# Patient Record
Sex: FEMALE | Race: BLACK OR AFRICAN AMERICAN | Employment: UNEMPLOYED | URBAN - METROPOLITAN AREA
[De-identification: names, ages, dates, MRNs, and addresses within clinical notes are randomized per-mention and may not be internally consistent; named-entity substitution may affect disease eponyms.]

---
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
## 2017-01-03 NOTE — PATIENT INSTRUCTIONS
Refill policies:    • Allow 2 business days for refills; controlled substances may take longer.   • Contact your pharmacy at least 5 days prior to running out of medication and have them send an electronic request or submit request through the “request re your physician has recommended that you have a procedure or additional testing performed. [facility]) will contact your insurance carrier to obtain pre-certification or prior authorization.     Unfortunately, [facility] has seen an increas registration in the [employer].     Medication:   Number of days you need to be off for the following medications:  • Aggrenox 10 days   • Agrylin (Anagrelide) 10 days   • Enbrel (Etanercept) 24 hours   • Fragmin (Dalteparin) 24 hours   • Pletal Austyn Rivero your blood sugar. Contact your primary care physician if your blood sugar rises as you may require some medication adjustment.   It is normal to have increased pain at injection site for up to 3-5 days after procedure, you can use heat for the first 24 bianca

## 2017-01-11 NOTE — TELEPHONE ENCOUNTER
Need additional insurance information for prior authorization request.    Spoke to patient, provided insurance contact phone #[phone]. Advised patient will contact her with any further issues, no further needs at this time.

## 2017-01-11 NOTE — TELEPHONE ENCOUNTER
Spoke to Sue at [address] requests ICD codes and facility tax ID, both provided, Sue did not take procedure codes. Sue states precertification will be required, provided precertification phone #[phone].  Time on call:2

## 2017-01-12 NOTE — TELEPHONE ENCOUNTER
Trevor Truong from [facility] contacted office to complete prior authorization request. Provided patient information, physician and facility information.  Requesting 2 dates of service for Thoracic Facet Injection ([id number]) and 2 dates of servic

## 2017-01-16 NOTE — TELEPHONE ENCOUNTER
Spoke with pt and informed her that any insurance questions needs to be answered by her insurance. Pt requesting Shyla's 1/3/2017 documentation be changed. I informed pt that she would need a follow up visit to discuss documentation. Call ended.

## 2017-01-16 NOTE — TELEPHONE ENCOUNTER
Spoke to [address] [place] [address] at [facility], all procedures have been denied, request does not meet medical necessity criteria. No option for Peer-Peer available. Jessie De Anda states an appeal can be made by sending [facility] and supporting documents to fax # [phone].

## 2017-01-16 NOTE — TELEPHONE ENCOUNTER
Returning call. Informed patient that all injections have been cancelled due to procedures being denied. Patient understood.

## 2017-01-16 NOTE — TELEPHONE ENCOUNTER
Harold Mcardle from [employer] contacted office, indicated she has spoken to patient regarding denial. Patient has requested office submit appeal for cervical injections.      Harold Mcardle states for appeal to be re-viewable, physicians notes need to be addended to Maki Domi

## 2017-01-16 NOTE — TELEPHONE ENCOUNTER
Spoke with kraig pt. Informed her of message received by insurance company, pt did not want to talk to RN. Pt transferred to prior authorization dept.

## 2017-01-16 NOTE — TELEPHONE ENCOUNTER
Patient called wanted to know if we were told my the insurance company what the medical criteria, I explained we ere not told. She said she will wait for [facility] from [address] to call her back.

## 2017-01-17 NOTE — TELEPHONE ENCOUNTER
Paul Mccoy from [employer] contacted office, states appeal request was received, states \"letter looks very good, looks like all required information was included\", advised she sent it to medical director review, requested a pain specialist review case.  Ke

## 2017-01-17 NOTE — TELEPHONE ENCOUNTER
Letter of Medical Necessity drafted, submitted with clinical notes, imaging reports and PT notes to [address] [facility] under ref #[id number], fax confirmation received, appeal request pending.

## 2017-01-17 NOTE — TELEPHONE ENCOUNTER
Spoke to patient,who was advised by Jordan Morgan at [employer] to contact office to provide following information:    Patient has had trial of NSAIDs including:  Ibuprofen OTC  Meloxicam 7.5mg and 15mg  Diazepam  Tramadol    Patient begin Physical Therapy thr

## 2017-01-20 NOTE — TELEPHONE ENCOUNTER
1. I was able to take care of Atenolol and Hydrochlorothiazide prescriptions. 2. The Tramadol and Diazepam can't be refilled until the full month expires from prescription dated 1/13/17. Alvin Rothman.  Britt Drummond MD  Diplomate, [address] [place] [address] Board of Internal Medicine

## 2017-01-23 NOTE — TELEPHONE ENCOUNTER
Spoke with pt informed her that we are still waiting for prior authorization. No further questions at this time.

## 2017-01-23 NOTE — TELEPHONE ENCOUNTER
Spoke with patient and informed patient of message below. Pt verbalized understanding. No further questions at this time.            [address]  PRE-PROCEDURE INSTRUCTIONS WITH IV SEDATION    Appointment Date:   2/14/17     &     2/21/17 ? Plavix (Clopidogrel)  ? Epidural ____ - 10 days  ? Others 7 days   NSAIDs: 24 hours    ?  Ibuprofen (Motrin, Advil, Vicoprofen), Naproxen (Naprosyn, Aleve), Piroxcam (Feldene), Meloxicam (Mobic), Oxaprozin (Daypro), Diclofenac (Voltaren),  Indomethacin (I Am I a candidate for radiofrequency ablation? Radiofrequency ablation is most commonly offered to patients with neck or back pain from facet joint problems like arthritis or injury.  For these patients radiofrequency ablation is used to interrupt nerves th The skin on the back or neck is cleaned with antiseptic solution and then the procedure is carried out. The skin is numbed with a local anesthetic. Then X-ray or fluoroscopy is used to guide placement of the introducer needles.  Since nerves cannot actually No. This procedure is done under local anesthesia. Most of the patients also receive intravenous sedation, which makes the procedure easier to tolerate.  It is necessary for you to be awake enough to communicate easily with the physician during the procedur It is sometimes difficult to predict if the radiofrequency ablation will indeed help you or not.  Generally speaking, the patients who have responded well to trial blocks will have better results than those who responded less well from diagnostic or trial i

## 2017-01-23 NOTE — TELEPHONE ENCOUNTER
Lakesha Wasserman from [employer] contacted office, states appeal request has been completed. Lakesha Wasserman states that all request for treatment of thoracic spine have been denied.  Request for RFA of bilateral Cervical Medial Branches have been approved, authorization #[id number]

## 2017-02-09 NOTE — TELEPHONE ENCOUNTER
Spoke to patient, states her insurance terminated on 1/23/17 and patient did not qualify for COBRA coverage. Patient states to cancel 2/14 and 2/21 procedures as she currently is uninsured.  Patient thanked office for hard work and help in getting injection

## 2017-02-09 NOTE — TELEPHONE ENCOUNTER
From: Dennis Clubs  To: Benigno Turk MD  Sent: 2/8/2017 5:21 PM CST  Subject: Visit Donel Candy Avilez,    I have recently lost my insurance coverage so I am unable to keep my appt with you, Feb 10th., Dr. Ramila Durbin or Dr. Caryl Velásquez, the

## 2017-04-06 NOTE — PATIENT INSTRUCTIONS
Refill policies:    • Allow 2 business days for refills; controlled substances may take longer.   • Contact your pharmacy at least 5 days prior to running out of medication and have them send an electronic request or submit request through the “request re insurance carrier to obtain pre-certification or prior authorization. Unfortunately, [facility] has seen an increase in denial of payment even though the procedure/test has been pre-certified.   You are strongly encouraged to contact your insurance carrier to v on the day of procedure. If you require a refill of medications, please contact the office 48 hours prior to your procedure.       Medication:   Number of days you need to be off for the following medications:  • Aggrenox 10 days   • Agrylin (Anagrelide) 10 frequency of your glucose monitoring after the procedure as this may cause a temporary increase in your blood sugar. Contact your primary care physician if your blood sugar rises as you may require some medication adjustment.   It is normal to have increas anesthetic before inserting the injection needle. Once numbed, placing the injection needle often feels like more of a strong pressure and pinching, often not as sharp pain. Will I be \"put out\" for the facet joint injection?   No, this procedure is done symptoms in 1 to 2 weeks a third injection maybe recommended. Can I have more than three facet joint injections? Most patients do not receive more than three injections in a six-month period.  This is because the effect of the medication injected frequen arthritis or injury. For these patients radiofrequency ablation is used to interrupt nerves that go directly to the individual facet joints. You must have responded well to diagnostic or trial injections to be a candidate for radiofrequency ablation.     Wh the procedure that you can report what you are feeling. The tissues surrounding the special electrically active needle tip are then heated when electric current is passed through it. This effectively numbs or stuns the nerves semi-permanently.  Once done, t last?  If successful, the effects of the radiofrequency ablation can last from 3-24 months, with a typical range of 6-9 months. How many radiofrequency ablations do I need to have?   If the first procedure does not completely relieve your symptoms, you ma

## 2017-04-06 NOTE — PROGRESS NOTES
Name: Reginald Membreno   : 2/10/1970   DOS: 2017     Pain Clinic Follow Up Visit:   Patient presents with:   Follow - Up: low back pain, neck pain      Reginald Membreno is a 52year old female who presents for recheck of chronic neck, thora TABLET BY MOUTH EVERY MORNING Disp: 90 tablet Rfl: 1   atenolol 25 MG Oral Tab Take 1 tablet (25 mg total) by mouth daily.  Disp: 90 tablet Rfl: 1   hydrocortisone 2.5 % External Cream  Disp:  Rfl:    AmLODIPine Besylate 5 MG Oral Tab Take 1 tablet (5 mg to Patient come back for bilateral subdeltoid bursa injection, right than left lumbar facet joint injections, upper right and left thoracic facet joint injection followed by radiofrequency ablation of the right than left cervical medial branch all with sedati

## 2017-04-10 PROBLEM — M51.36 BULGING LUMBAR DISC: Status: ACTIVE | Noted: 2017-04-10

## 2017-04-10 PROBLEM — M47.812 CERVICAL SPINE ARTHRITIS: Status: ACTIVE | Noted: 2017-04-10

## 2017-04-10 PROBLEM — M47.816 ARTHRITIS, LUMBAR SPINE: Status: ACTIVE | Noted: 2017-04-10

## 2017-04-10 PROBLEM — M51.369 BULGING LUMBAR DISC: Status: ACTIVE | Noted: 2017-04-10

## 2017-04-10 PROBLEM — M47.814 THORACIC ARTHRITIS: Status: ACTIVE | Noted: 2017-04-10

## 2017-04-10 PROBLEM — M54.16 CHRONIC LUMBAR RADICULOPATHY: Status: ACTIVE | Noted: 2017-04-10

## 2017-04-10 NOTE — TELEPHONE ENCOUNTER
Spoke to patient, advised of message below. Patient will call insurance to confirm they will only cover one procedure. Patient states if that is the case, she prefers shoulders be completed.  Advised patient will wait to cancel additional procedures until s

## 2017-04-10 NOTE — TELEPHONE ENCOUNTER
Spoke to Cheyenne at [employer], she stated for outpatient surgery no Leonela Vasquez is needed, she did state that her insurance only allows one procedure per her plan year and will only pay $250 and the remainder of the procedures the pt would be responsible.  Call ref#[id number]

## 2017-04-10 NOTE — PROGRESS NOTES
Patient presents with: Other: f/u HTN and Raynaud's - 4-month follow up  Other: chronic neck and back pain syndrome      HPI: The patient presents today for eval of multiple concerns:    1. HTN - Stable on prescription medication.   She is compliant w/ Rxs daily. Disp: 90 tablet Rfl: 1   FLUTICASONE PROPIONATE 50 MCG/ACT Nasal Suspension USE 2 SPRAYS IN EACH NOSTRIL DAILY NEEDED FOR RHINITIS. Disp: 16 g Rfl: 3   Cholecalciferol (VITAMIN D) 2000 UNITS Oral Cap Take by mouth.  Disp:  Rfl:    Fexofenadine HCl (A q AM to see if this'll help.   3. Chronic neck and back pain 2/2 of spinal arthritis (cervical, thoracic, and lumbar) along w/ lumbar disc bulging and chronic lumbar radiculopathy - She's been working w/ Dr. Manny Echeverria team from interventional pain medicine,

## 2017-04-11 NOTE — TELEPHONE ENCOUNTER
Patient returned call, confirmed current insurance will only cover 1 procedure per year, patient requesting to have \"low back procedures\" as 1 procedure, move shoulders after.  Patient requests that additional injections are not cancelled, states she is l

## 2017-04-19 NOTE — TELEPHONE ENCOUNTER
Patient added to waitlist for a sooner appointment. Patient requesting an estimate for injection, provided patient with number for [facility]. Patient verbalized understanding, no further needs at this time.

## 2017-05-01 NOTE — TELEPHONE ENCOUNTER
Patient returned call, agreeable to moving appointments sooner. Advised patient will move all procedures up by one week. Patient verbalized understanding, no further needs at this time.

## 2017-05-01 NOTE — TELEPHONE ENCOUNTER
[address] [place] [address]  PRE-PROCEDURE INSTRUCTIONS WITH IV SEDATION    Appointment Date: 5/2/17, 5/23/17, 5/30/17, 6/6/17, 6/13/17, 6/20/17 and 6/27/17   Arrival Time: 1:00pm, 12:00pm, 12:00pm, 12:00pm, 12:00pm, 12:00pm and 12:00pm  Procedure Time: 2: ? Xarelto (Rivaroxaban) 3 days  ? Lovenox (Enoxaparin) 24 hours  ? Aspirin  ? 81mg 24 hours  ? Greater than 81 mg (325mg) 7 days  ? Coumadin Procedure may be cancelled if INR is elevated. ? Epidural ____ - 7 days  ? Others 5 days  ?  Excedrin (with aspiri

## 2017-05-02 NOTE — TELEPHONE ENCOUNTER
After consulting with patient in the preop area today and also with Dr. Myriam Maravilla, we recommend patient to be scheduled for the following injections.  Please use the time slot set aside on 5/9 for the first one and then the other injections per patient availabil

## 2017-05-02 NOTE — H&P
History & Physical Examination    Patient Name: Vivienne Barros  MRN: [id number]  CSN: [id number]  YOB: 1970    Pre-Operative Diagnosis:  Subdeltoid bursitis, unspecified laterality [M75.50]    Present Illness: A 52year old female with Degenerative joint disease C5-C6   • Multinodular thyroid 9/16/2016         Past Surgical History    TUBAL LIGATION  2002    HYSTERECTOMY  9/11/2012    Comment partial, still has cervix and ovaries    OTHER SURGICAL HISTORY  2009    Comment colposcopy the History and Physical done within the last 30 days. Any changes noted above.     JAMAICA Davis

## 2017-05-02 NOTE — OR NURSING
[address], [facility] updated pt took her Meloxicam last evening at 1700. Per Dr Myriam Maravilla ok to continue with the procedure.

## 2017-05-03 NOTE — TELEPHONE ENCOUNTER
Spoke to patient, patient states office let her down as she thought she should have SI injection prior to shoulder. Patient completed Bursa injection of 5/2. Patient requesting to be on the schedule on 5/9 for Bilateral SI.  Advised patient will place her o

## 2017-05-03 NOTE — OPERATIVE REPORT
[facility]  Operative Report  2017     Ana Song Patient Status:  Hospital Outpatient Surgery    2/10/1970 MRN [id number]   Location [facility] Attending No att. providers found   [facility] Day # 0 PCP each side. The patient tolerated the procedure very well. The patient has complete understanding of the risks and benefits of the procedure. Complications: None. Follow up: The patient will be followed in the pain clinic as needed basis.       Wendy Kennedy

## 2017-05-08 NOTE — TELEPHONE ENCOUNTER
Spoke to patient, states she does not want to proceed with injections as scheduled as she cannot afford them. Patient states she will be getting new insurance as of 6/1, would like to reschedule injections after 6/1.  Procedures rescheduled, patient advised ? If you have an implanted Spinal Cord or Peripheral Nerve Stimulator: Please remember to turn device off for procedure      Medication:   Number of days you need to be off for the following medications:  ? Aggrenox 10 days   ?  Agrylin (Anagrelide) 10 days If you are a diabetic, please increase the frequency of your glucose monitoring after the procedure as this may cause a temporary increase in your blood sugar.   Contact your primary care physician if your blood sugar rises as you may require some medicatio

## 2017-06-14 NOTE — TELEPHONE ENCOUNTER
Patient returned call, states she has not had a change of insurance. Patient states that she spoke with her insurance and was told that she is eligible for \"re-pricing\".  Patient states she called central scheduling and they could not give her an exact co

## 2017-06-14 NOTE — TELEPHONE ENCOUNTER
Spoke to patient, confirmed she is cancelling all future procedures. Patient states she will be getting new insurance at open enrollment time, wants to know how far in advance she can call to schedule procedures.  Advised patient to call office as soon as s

## 2017-06-14 NOTE — TELEPHONE ENCOUNTER
From: Chapito Thomas  To: Cristofer Major MD  Sent: 6/14/2017 1:06 PM CDT  Subject: Medication Renewal Request    Original authorizing provider: MD Chapito Leahy would like a refill of the following medications:  TRAMADOL HCL 50

## 2017-06-15 NOTE — PROGRESS NOTES
Donis Tyler is a 52year old female. HPI:   Patient presents with: Follow - Up  Lab Results  Patient presents to discuss several issues. She has had unexplained weight loss, going on for past 6-7 months.   Has lost almost 30 pounds since Oct 10 MG Oral Tab, Take 1 tablet (10 mg total) by mouth daily. , Disp: 90 tablet, Rfl: 1  •  hydrocortisone 2.5 % External Cream, , Disp: , Rfl:   •  FLUTICASONE PROPIONATE 50 MCG/ACT Nasal Suspension, USE 2 SPRAYS IN EACH NOSTRIL DAILY NEEDED FOR RHINITIS., D auscultation bilaterally, no wheezing/rubs  CARDIO: RRR without murmurs. No clubbing, cyanosis or edema.   GI: soft non tender nondistended no hepatosplenomegaly, bowel sounds throughout  NEURO: CN II-XII intact  MS: Full ROM, no joint pain  PSYCH: pleasan

## 2017-06-15 NOTE — PATIENT INSTRUCTIONS
- check with insurance about coverage for x-ray and blood work  - check with insurance about in-network endocrinologists  - Depending on results, we may consider starting an anti-depressant medication. It was a pleasure seeing you in the clinic today.

## 2017-07-10 PROBLEM — J30.9 CHRONIC ALLERGIC RHINITIS: Status: ACTIVE | Noted: 2017-07-10

## 2017-07-10 NOTE — PROGRESS NOTES
Patient presents with: Follow - Up: Blood pressure; Raynaud's; Chronic allergies      HPI: The pt presents today for f/u chronic medical conditions, specifically HTN, Raynaud's and Allergic rhinitis.   Doing well on prescription medication regarding the HT TAKE 1 TABLET (25 MG TOTAL) BY MOUTH DAILY. , Disp: 90 tablet, Rfl: 1  •  Meloxicam 5 MG Oral Cap, Take by mouth., Disp: , Rfl:   •  hydrocortisone 2.5 % External Cream, , Disp: , Rfl:   •  FLUTICASONE PROPIONATE 50 MCG/ACT Nasal Suspension, USE 2 SPRAYS IN ask questions, which were then answered to the best of my ability. Treasure Taylor. Radha Goetz MD  Diplomate, American Board of Internal Medicine  [facility]  [address]  [facility] [facility],[address]Th Floor, [address], [facility], [address] [place] [address]  T: [id number]; F: [phone]     Meds &

## 2017-08-07 NOTE — TELEPHONE ENCOUNTER
From: Dangelo Castellon  To: Patricio Patel MD  Sent: 8/6/2017 10:14 PM CDT  Subject: Test Results Question    Helmargo Mccoy. I trust you had a bing weekend. Now that aside. ..     I have several test results that have come in and therefore I am lost.

## 2017-08-14 NOTE — TELEPHONE ENCOUNTER
From: Terra Castellon  Sent: 8/11/2017 1:05 PM CDT  Subject: Medication Renewal Request    Terra Castellon would like a refill of the following medications:  HYDROCHLOROTHIAZIDE 25 MG Oral Tab Beckie Muñoz MD]  AmLODIPine Besylate 10 MG Oral T

## 2017-08-17 NOTE — TELEPHONE ENCOUNTER
Pt called regarding anemia. Pt has appointment with hematologist in 2 weeks but inquiring if she should be on iron supplement until then? Please advise.

## 2017-08-17 NOTE — TELEPHONE ENCOUNTER
The answer would be no. The iron tests that were done did not indicate iron-deficiency. Darion Leija. Remberto Heller MD  Diplomate, American Board of Internal Medicine  [facility]  [address]  [facility],[address]Th Floor, [address], [facility], [address] [place] [address]  T: [phone]; F: 63

## 2017-08-25 NOTE — TELEPHONE ENCOUNTER
Pt called to request a 4 day supply for all her medication as pt is currently on vacation in [place]. Pt stated forgot all medications. Please refill if appropriate for 4 days. Pharmacy in the system already.  [facility]- [facility]

## 2017-09-06 NOTE — PROGRESS NOTES
Patient presents with:  Lab Results: 8-1-17 - Anemia      HPI: The pt presents today essentially for reassessment of anemia via labs. In particular, she has TONY. She will be seeing both Hematology and Gastroenterology soon once her schedule allows.   I ga Externally Cream, Apply  topically as needed. Daily, Disp: , Rfl:   •  selenium sulfide (SELSUN) 2.5 % Apply Externally Lotion, Apply  topically daily as needed (tinea versicolor). , Disp: , Rfl:     Smoking status: Never Smoker TABLET BY MOUTH EVERY 12 HOURS AS NEEDED FOR PAIN      diazepam 5 MG Oral Tab 60 tablet 0      Sig: TAKE 1 TABLET BY MOUTH EVERY 12 HOURS AS NEEDED FOR ANXIETY           Imaging & Consults:  ENT - INTERNAL

## 2017-09-08 NOTE — TELEPHONE ENCOUNTER
Patient called requesting to speak with the nurse regarding her prescription for MELOXICAM 7.5 MG Oral Tab  She is wondering why her prescription is written for \"once a day\" when she has previously been taking 7.5 mg \"twice a day\"

## 2017-09-09 NOTE — TELEPHONE ENCOUNTER
OK to take BID dosing. Does she desire a new script reflecting this change? Ollie Hill. Angel Griffin MD  Diplomate, American Board of Internal Medicine  [facility]  [address] [facility],[address], [address], [facility], [address] [place] [address]  T: [phone]; F: [address]

## 2017-09-11 NOTE — TELEPHONE ENCOUNTER
Done.    Flora Oneal MD  Diplomate, American Board of Internal Medicine  [facility]  [address] [facility],[address], [address], [address]  T: [phone]; F: [address]

## 2017-09-26 NOTE — TELEPHONE ENCOUNTER
Pt called to request a call back form Dr Betzy Cotton. Pt sated \"private patient-Dr conversation\" needed.  Please call [phone]

## 2017-09-28 NOTE — TELEPHONE ENCOUNTER
I called the patient. She has been going thru mood disorder and being under [place] lot of psychological stress. \"  No SI or HI. Not functional well at work or at home. I've recommended Dr. Gerson Quach. Contact info given. Darion Leija.  Remberto Heller MD  Diplomate,

## 2017-10-05 NOTE — TELEPHONE ENCOUNTER
Pt reviewed her lab results via [address] Vitamin B12 and Vit D low normal range and folic acid low. Should she be taking supplements?

## 2017-10-05 NOTE — TELEPHONE ENCOUNTER
Patient is questioning lab results from 's nicholas message - has questions please callback to discuss

## 2017-10-07 NOTE — TELEPHONE ENCOUNTER
A robust once-daily women's multivitamin as well as the consumption of fruits/veggies/whole grains/protein should be sufficient. Ely Priest. Med Castro MD  Diplomate, American Board of Internal Medicine  [facility]  [address], [address], [place]

## 2017-10-12 RX ORDER — DIAZEPAM 5 MG/1
5 TABLET ORAL EVERY 12 HOURS PRN
Qty: 60 TABLET | Refills: 0 | Status: SHIPPED | OUTPATIENT
Start: 2017-10-12 | End: 2017-11-07

## 2017-10-13 NOTE — TELEPHONE ENCOUNTER
MRI shows a stable lesion. Lesion has not grown since 2009. Last office visit was in 11/2016. Should repeat imaging again in 2 years and then follow-up in office to review. Pt should call for order. Thanks.

## 2017-10-17 PROBLEM — D64.9 NORMOCYTIC ANEMIA: Status: ACTIVE | Noted: 2017-10-17

## 2017-10-17 NOTE — PROGRESS NOTES
Hematology Initial Consultation Note    Patient Name: Segundo Castellon  Medical Record Number: [id number]    YOB: 1970   Date of Consultation: 10/17/2017   PCP: Dr. Clemmie Runner  Other providers:  Dr. Wendel Alpers    Reason for Consultation: Surgical History:  9/11/2012: HYSTERECTOMY      Comment: partial, still has cervix and ovaries  2009: OTHER SURGICAL HISTORY      Comment: colposcopy  2015: OTHER SURGICAL HISTORY      Comment: abdominoplasty  2/23/2016: OTHER SURGICAL HISTORY Right      C Used    Alcohol use Yes  0.0 oz/week     Comment: Socially    Drug use: No    Sexual activity: Not on file     Other Topics Concern    Caffeine Concern Yes    Comment: 3-4 or more cups of tea daily    Exercise No    Comment: PT only     Social History Narr 09/05/2017   RDW 15.4 09/05/2017   .0 09/05/2017   .0 08/01/2017   .0 08/25/2016       Lab Results  Component Value Date   GLU 67 (L) 08/01/2017   BUN 21 (H) 08/01/2017   CREATSERUM 1.16 (H) 08/01/2017   CREATSERUM 1.02 08/25/2016   CR Pathology:  I personally reviewed her peripheral blood smear from today which showed some hypersegmented neutrophils, hypochromatic RBCs, otherwise was unremarkable. Impression & Plan:     *Anemia- +fatigue  -CBC with smear review as above.    -ch

## 2017-10-17 NOTE — PROGRESS NOTES
Patient here for initial MD visit. Referred by Dr. Sarah Pool. Was diagnosed with Raynaud's recently. That is when they did the lab work up and found the abnormal labs. C/O increased fatigue.   Also states she has a sleep apnea issue that she needs to [employer]

## 2017-10-17 NOTE — PATIENT INSTRUCTIONS
For Dr. Yvette Alaniz nurse line, call [phone] with any questions or concerns,  Monday through Friday 8:00 to 4:30. After hours or weekends for urgent needs: [phone].   Central [phone]  Medical Records:   [phone]  [facility] Cent

## 2017-10-23 NOTE — TELEPHONE ENCOUNTER
I called pt and discussed labs results. Essentially still has mild anemia, without known cause. Will have her return in 1 month for repeat CBC w/ diff, if hgb not improving, or more abnormalities present, will perform a bmbx at that time.  I told her to anisha

## 2017-11-03 PROBLEM — Z91.09 POLLEN ALLERGIES: Chronic | Status: ACTIVE | Noted: 2017-11-03

## 2017-11-07 NOTE — PROGRESS NOTES
Patient presents with: Other: HTN and Raynaud's f/u      HPI: The pt presents today for eval of 2 issues:    1. HTN - BP's have been low. She is on 3 meds and wants to know if she can stop any of them.   2. Raynaud's disease w/o gangrene - Continues w/ Am Disp: , Rfl:   •  diazepam 5 MG Oral Tab, Take 1 tablet (5 mg total) by mouth every 12 (twelve) hours as needed for Anxiety. , Disp: 60 tablet, Rfl: 0  •  Meloxicam 7.5 MG Oral Tab, Take 1 tablet (7.5 mg total) by mouth 2 (two) times daily. , Disp: 60 tablet Internal Medicine  [facility]  [address] [facility], [address], [facility], [address]  T: [address] [facility]; F: [phone]     Meds & Refills for this Visit:  No prescriptions requested or ordered in this encounter    Imaging & Consults:  None

## 2017-11-07 NOTE — TELEPHONE ENCOUNTER
From: Bart Castellon  Sent: 11/7/2017 4:21 PM CST  Subject: Medication Renewal Request    Bart Duckworth.  Ravinder would like a refill of the following medications:     diazepam 5 MG Oral Tab Debra Elliott MD]     TRAMADOL HCL 50 MG Oral Tab [Sukhdeep Diaz

## 2017-11-08 NOTE — TELEPHONE ENCOUNTER
From: Dangelo Castellon  To: Patricio Patel MD  Sent: 11/7/2017 6:20 PM CST  Subject: Prescription Question    [facility] my physical [facility] for my Diazepam and my Tramadol.  I requested, through your nurse today for an

## 2017-11-09 NOTE — TELEPHONE ENCOUNTER
Dr Carla Torres please reprint Diazepam and Tramadol.   Rx to be faxed to [address] [place] [address] on [facility] store # [phone] and will need to be cancelled on [place] store # [phone]

## 2017-12-01 NOTE — TELEPHONE ENCOUNTER
Requesting   LOV:11/7/17  RTC: PRN  Last Relevant Labs: 8/1/17  Filled:   atenolol 25 MG Oral Tab 4 tablet 0 8/25/2017 8/29/2017    HTN - BP is on the lower end. OK in my opinion to D/C both the Atenolol and the Hydrochlorothiazide.   Continue Amlodipine a

## 2017-12-07 RX ORDER — POTASSIUM CHLORIDE 20 MEQ/1
TABLET, EXTENDED RELEASE ORAL
Qty: 180 TABLET | Refills: 0 | Status: SHIPPED | OUTPATIENT
Start: 2017-12-07 | End: 2018-03-24

## 2017-12-07 NOTE — TELEPHONE ENCOUNTER
Medication(s) to Refill:   Pending Prescriptions Disp Refills    POTASSIUM CHLORIDE ER 20 MEQ Oral Tab CR [Pharmacy Med Name: Potassium Chloride Malika ER Oral Tablet Extended Release 20 MEQ] 90 tablet 0     Sig: TAKE ONE TABLET BY MOUTH TWICE DAILY            Last Time Medication was Filled:  10/26/17, 90 tablets    Last Office Visit with PCP: Visit date not found    When Patient was Due Back to the Office:  (from when PCP last addressed medication)  [] 1 month,  [] 3 months,  [] 6 months,  [] 12 months,  [] Other      Future Appointments  Date Time Provider Kellen Antoine   1/4/2018 1:00 PM Ritchie Ligas, DO [address] [place] [address] SUB GI   1/4/2018 1:15 PM Ritchie Ligas, [address] GI   1/5/2018 1:30 PM MASTER [facility] [place]        [] Prescription needs to be printed at site and faxed to pharmacy  []Controlled Substance, prescription needs to be printed at the site, site to notify patient when ready  Unable to Refill per Protocol:   [] Office visit due (90 day supply prescription extension has already been granted)   [] Blood Pressure out of range   [] Labs Overdue  []  Medication has never been prescribed by [facility] provider   [x] Other     Last Blood Pressures:  BP Readings from Last 2 Encounters:  11/07/17 : 100/60  11/03/17 : 104/74      Recent Labs:

## 2018-01-17 NOTE — PROGRESS NOTES
Tanvi Wolf is a 52year old female who presents for a complete physical exam.   HPI:   Pt presents for annual wellness screening AND discussion of multiple other medical issues. She is aware that she may be billed separately for this.     HTN - History:   Diagnosis Date   • Abdominal distention 2014    Frequently   • Abdominal pain Intermitted   • Anemia    • Anxiety    • Back pain    • Back problem     Degenerative joint disease C5-C6   • Bad breath    • Belching    • Bleeding nose     2015   • colposcopy  2015: OTHER SURGICAL HISTORY      Comment: abdominoplasty  2/23/2016: OTHER SURGICAL HISTORY Right      Comment: bunion surgery  10/11/16: OTHER SURGICAL HISTORY Bilateral      Comment: Breast reduction surgery - Dr. Barbara Graham  No date: REDUCTION LE normal S1 & S2, no murmur  GI: soft, non-tender, non-distended, no hepatoplenomegaly  : deferred  BREAST: surgical incisions intact, no lumps palpated, no nipple changes or discharge, or other abnormalities  AXILLA: no palpable lymph nodes  MUSCULOSKELET

## 2018-01-30 NOTE — TELEPHONE ENCOUNTER
Leandra Puckett, from [facility],  department,  calling to follow up on note restrictions.     T: [phone]  F: [phone]

## 2018-01-31 NOTE — TELEPHONE ENCOUNTER
Patient called upset stating that there has been miscommunication regarding this issue. Patient requested call directly from Dr. Gorge Kelly, otherwise does want want a call back from triage.  Notified MD not in the office, but she can send a [employer] message

## 2018-01-31 NOTE — TELEPHONE ENCOUNTER
Please find out from patient what she'd like the note to say. She has not responded to my [employer]t message and since she did not see Dr. Remberto Heller for this issue I have been the one writing her notes for work.

## 2018-01-31 NOTE — TELEPHONE ENCOUNTER
Per Pao Case, pt stated needs letter to have sentence \"However, if assistance is needed for certain tasks, this should not be prohibited\".

## 2018-02-19 NOTE — TELEPHONE ENCOUNTER
Amlodipine 10 mg 1 tab daily filled 8-25-17 4 tab with 0 refills     LOV 1-17-18 HTN - compliant with meds, no SEs    HTN: controlled, cont atenolol, amlodipine    return here in 6 months

## 2018-03-06 NOTE — TELEPHONE ENCOUNTER
[place] pharmacy called to follow up on faxed refill request for hydrochlorothiazide 25 MG Oral Tab

## 2018-03-26 NOTE — TELEPHONE ENCOUNTER
From: Whit Márquez  Sent: 3/24/2018 10:48 AM CDT  Subject: Medication Renewal Request    Krista Mott.  Wan Luna would like a refill of the following medications:     POTASSIUM CHLORIDE ER 20 MEQ Oral Tab CR Abbi Morales MD]    Preferred pharmacy:

## 2018-03-26 NOTE — TELEPHONE ENCOUNTER
Potassium chloride er 20 meq 1 tab bid filled 12-7-17 180 with 0 refills     LOV 1-17-18      return here in 6 months    Labs 8-1-17     You should also start daily potassium supplements    Potassium 3.6 - 5.1 mmol/L 3.3

## 2018-04-02 NOTE — TELEPHONE ENCOUNTER
From: Alvin Lee  Sent: 4/2/2018 11:22 AM CDT  Subject: Medication Renewal Request    Cristiane Vega.  Wayne Chou would like a refill of the following medications:     TraMADol HCl 50 MG Oral Tab Lul Arroyo MD]    Preferred pharmacy: [facility]

## 2018-04-02 NOTE — TELEPHONE ENCOUNTER
Refill requested: Tramadol     Failed protocol    Last refill: 12/8/17 #60 NR    Last OV / RTC advised: 1/17/18 Minor Brizuela RTC 6 months  Appt Scheduled: Yes  Your appointments     Date & Time Appointment Department [facility])    Apr 17, 2018 12:00 PM CDT Exam -

## 2018-04-19 PROBLEM — N60.02 BILATERAL BREAST CYSTS: Status: ACTIVE | Noted: 2018-04-19

## 2018-04-19 PROBLEM — N60.01 BILATERAL BREAST CYSTS: Status: ACTIVE | Noted: 2018-04-19

## 2018-04-19 NOTE — PROGRESS NOTES
Patient presents with:  Confusion: Ongoing and progressive along w/ difficulty word finding and memory lapses  Other: Referral to breast specialist b/c of B breast cysts; HTN; thyroid; Raynauds      HPI: The pt presents today for eval of multiple concerns Current Outpatient Prescriptions:   •  TraMADol HCl 50 MG Oral Tab, TAKE 1 TABLET BY MOUTH EVERY 12 HOURS AS NEEDED FOR PAIN, Disp: 60 tablet, Rfl: 0  •  POTASSIUM CHLORIDE ER 20 MEQ Oral Tab CR, TAKE ONE TABLET BY MOUTH TWICE DAILY , Disp: 180 tab supple, no JVD, no thyromegaly  Lungs - CTAB  CV - RRR, nl s1, s2  Abd - soft, NABS, NT, ND  Ext - no c/c/e  Neuro - CNs 2-12 grossly intact, no focal deficits; 2+ DTRs  Psych - nl mood/affect      Labs/Diagnostics:  Reviewed and per the scanned EHR      A

## 2018-04-24 NOTE — TELEPHONE ENCOUNTER
From: Vikash Cavazos  Sent: 4/24/2018 2:24 PM CDT  Subject: Medication Renewal Request    Ariela Edwards would like a refill of the following medications:     diazepam 5 MG Oral Tab Alicia Lopes MD]    Preferred pharmacy: [place] [id number]

## 2018-04-24 NOTE — TELEPHONE ENCOUNTER
Medication(s) to Refill:   Pending Prescriptions Disp Refills    diazepam 5 MG Oral Tab 60 tablet 5     Sig: Take 1 tablet (5 mg total) by mouth every 12 (twelve) hours as needed for Anxiety.          Reason for Medication Refill being sent to Provider / Re

## 2018-04-25 RX ORDER — MELOXICAM 7.5 MG/1
TABLET ORAL
Qty: 60 TABLET | Refills: 5 | Status: SHIPPED | OUTPATIENT
Start: 2018-04-25 | End: 2018-05-21 | Stop reason: ALTCHOICE

## 2018-05-09 PROBLEM — D63.8 ANEMIA OF CHRONIC DISEASE: Status: ACTIVE | Noted: 2018-05-09

## 2018-05-09 NOTE — PATIENT INSTRUCTIONS
For Dr. Devin Obrien nurse line, call [phone] with any questions or concerns,  Monday through Friday 8:00 to 4:30. After hours or weekends for urgent needs: [phone].   Central [phone]  Medical Records:   [phone]  Cancer Cent

## 2018-05-09 NOTE — PROGRESS NOTES
Patient here for MD cisneros. Last seen 10/2017. C/O increased fatigue and SOB. Pain in right hand for about one week, she states she hit it on something. Previous pain in shoulder and back. Occasional constipation, takes stool softers for.

## 2018-05-11 NOTE — TELEPHONE ENCOUNTER
Referral placed for Dr. Corona Ann. Patient should see him for evaluation and planning regarding the thyroid nodules. Angela Silverio. Katie Bui MD  Diplomate, American Board of Internal Medicine  [facility]  [address], [facility] & [place] [address]

## 2018-05-11 NOTE — TELEPHONE ENCOUNTER
Patient was inquiring about a BRCA mutation testing. She has no family history of any malignancies. We discussed that BRCA testing in her is not indicated.   The remainder of her labs work up of her now very mild normocytic anemia is back- her EPO level i

## 2018-05-11 NOTE — TELEPHONE ENCOUNTER
From: Kenneth Callahan  To: Kavya Padilla MD  Sent: 5/11/2018 1:30 PM CDT  Subject: Other      Hi Dr. Laz Chacko. Message confirmed. This request sounds like it is not good.      I have a question about US THYROID (CKW=[id number]) resulted on 5/3/18 at 5:19 PM.

## 2018-05-14 NOTE — TELEPHONE ENCOUNTER
Refill requested: Tramadol and Hydrochlorothiazide     Failed protocol - Tramadol  Passed protocol - Hydrochlorothiazide       Last refill: Tramadol 4/2/18 #60 NR + Hydrochlorothiazide 3/6/18 #90 NR    Relevant Labs: CMP 4/27/18   Last OV / RTC advised: 4/

## 2018-05-17 NOTE — PROGRESS NOTES
Breast Surgery New Patient Consultation    This is the first visit for this 50year old woman, referred by Dr. Chris Ray, who presents for evaluation of right breast cyst and discomfort.     History of Present Illness:   Ms. Cary Cote is a 50 year o TOTAL ABDOM HYSTERECTOMY  2002: TUBAL LIGATION    Gynecological History:  Pt is a   Pt was 16years old at time of first pregnancy.     She denies any cumulative breastfeeding history  She achieved menarche at age 6 and LMP     Age of Menopause: 43  Ty Pollen                    [place]            Family History:   Family History   Problem Relation Age of Onset   • Heart Disorder Mother    • Cancer Neg        She is not of Ashkenazi Confucianism ancestry.     Social History:    Alcohol use Yes 0.0 oz/ discharge. Skin:    The patient denies +rash, itching, skin lesions, dry skin, +change in skin color or change in moles. Hematologic/Lymphatic:  The patient denies +easily bruising or bleeding or persistent swollen glands or lymph nodes.      Musculo areola appear normal. There is no skin dimpling with movement of the pectoralis. There is no nipple retraction. No nipple discharge can be elicited. The parenchyma is mildly nodular.  There are no dominant masses in the breast. The axillary tail is normal. at 3:00, 2 cm from nipple. Focused sonography of this area demonstrated a simple cyst measuring 6 mm in maximum dimension. No suspicious solid or cystic lesion seen in any other locations in bilateral breasts.     I explained that simple cysts are a common unless her clinical exam change prior. She was advised that if her pain escalates or is focal in location, she should contact the office for possible aspiration as needed.  The risks and possible complications of the procedure were explained to the patient

## 2018-05-21 NOTE — PROGRESS NOTES
HPI:    Patient ID: April Moreno is a 50year old female. PCP: Dr Elio Oneal    Thank you for referring this patient to us. Below is the summary of my evaluation.     HPI    Isael Jose is a 50year old female who presents for evaluation of memory l HYSTERECTOMY      Comment: for fibriods- supracervical- still has cervix                and ovaries  2009: OTHER SURGICAL HISTORY      Comment: colposcopy  2015: OTHER SURGICAL HISTORY      Comment: abdominoplasty  2/23/2016: OTHER SURGICAL HISTORY Right MG Oral Tab Take 150 mg by mouth nightly. Disp:  Rfl:    hydrocortisone (PROCTOSOL HC) 2.5 % Rectal Cream Insert 1 applicator in Rectum twice a day Disp: 28.35 g Rfl: 0   ketoconazole (NIZORAL) 2 % Apply Externally Cream Apply  topically as needed.  Daily D proprioception  Motor: Normal tone and bulk in all extremities. Strength is 5/5 in all muscle groups  Reflexes: symmetric and present  Coordination: Intact   Gait: Normal casual gait.            ASSESSMENT/PLAN:   Mild cognitive impairment with memory loss

## 2018-05-21 NOTE — PATIENT INSTRUCTIONS
Refill policies:    • Allow 2-3 business days for refills; controlled substances may take longer.   • Contact your pharmacy at least 5 days prior to running out of medication and have them send an electronic request or submit request through the “request re entire amount billed. Precertification and Prior Authorizations: If your physician has recommended that you have a procedure or additional testing performed.   [facility]) will contact your insurance carrier to obtain pre-certi

## 2018-05-25 NOTE — TELEPHONE ENCOUNTER
From: April Moreno  To: Tevin Mendez MD  Sent: 5/24/2018 9:16 PM CDT  Subject: Prescription Question    Hi Dr. Elio Oneal. Dr. Ainsley Cortez prescribed mifedipine. ..it is too costly. I would rather stay with the Amlodipine. Please approve refill. Thank you.

## 2018-05-31 NOTE — PROGRESS NOTES
Patient presents with: Other: raynaud's      HPI: The pt presents today for f/u Raynaud's disease. Has been working w/ Rheum. Had a script for Nifedipine recently prescribed last week, but it was too costly.   She MyChart'd me to see if I would go ahead 11  •  RaNITidine HCl 150 MG Oral Tab, Take 150 mg by mouth nightly., Disp: , Rfl:   •  hydrocortisone (PROCTOSOL HC) 2.5 % Rectal Cream, Insert 1 applicator in Rectum twice a day, Disp: 28.35 g, Rfl: 0  •  ketoconazole (NIZORAL) 2 % Apply Externally Cream
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
## 2018-06-08 NOTE — TELEPHONE ENCOUNTER
Will send her [email or web address] message. Lew Campbell. Jacque Cox MD  Diplomate, American Board of Internal Medicine  [facility]  [address], [facility], [address]  T: [id number]; F: [address]

## 2018-06-28 NOTE — TELEPHONE ENCOUNTER
Patient called and stated that she is having back spasms and could barely get out of bed this morning. She stated that she is taking Tramadol and it's not working. She would like to speak with a nurse. Please advise.

## 2018-06-28 NOTE — TELEPHONE ENCOUNTER
Script for Medrol dosepak and Tizanidine sent to pharmacy. Ev Whalen. Betzy Cotton MD  Diplomate, American Board of Internal Medicine  [address] [place] [address] [address]  [address] [place] [address],[address], [address], [facility], [address] [place] [address]  T: [id number]; F: [address]

## 2018-07-10 NOTE — PROGRESS NOTES
Breast Surgery Surveillance    History of Present Illness:   Ms. Deacon Ayon is a 50year old woman who presented with self detected pain in the right breast and was last seen in May 2018.   She denies any palpable masses, skin changes or axillary time of first pregnancy. She denies any cumulative breastfeeding history  She achieved menarche at age 6 and LMP     Age of Menopause: 43  Type: Hysterectomy with ovaries preserved  She denies any history of hormone replacement therapy.   She has histor has 3 children. She is employed part-time. Review of Systems:  General:   The patient denies, fever, chills, night sweats, +fatigue, generalized weakness, change in appetite or +weight loss.     HEENT:     The patient denies +eye irritation, cataracts, pain.    Neuropsychiatric:  There is no history of +migraines or severe headaches, seizure/epilepsy, speech problems, +coordination problems, trembling/tremors, fainting/black outs, dizziness, memory problems, +loss of sensation/numbness, problems walking, nipple. .  Left breast:   The skin, nipple, and areola appear normal. There is no skin dimpling with movement of the pectoralis. There is no nipple retraction. No nipple discharge can be elicited. The parenchyma is mildly nodular.  There are no dominant mass symptomatic, occasionally interventions such as aspiration may be performed for transient relief of symptoms.  I discussed that the relief is transient as the wall of the cyst maintains its integrity and therefore fluid tends to re-accumulate within the cys possible ultrasound in the office at that time in 3 months to assess the progression of the cyst. 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

## 2018-07-26 NOTE — TELEPHONE ENCOUNTER
From: Joe Chen  Sent: 7/26/2018 1:43 PM CDT  Subject: Medication Renewal Request    Paula Chao would like a refill of the following medications:     TiZANidine HCl 2 MG Oral Tab Tea Hernandez MD]    Preferred pharmacy: [id number]

## 2018-07-27 NOTE — TELEPHONE ENCOUNTER
Tizanidine 2 mg 1 tab q6 prn filled 6/28/18 30 with 0 refills     LOV 5/31/18    RTC 4 months    Labs

## 2018-08-28 NOTE — TELEPHONE ENCOUNTER
From: Trina Garg  Sent: 8/28/2018 12:34 PM CDT  Subject: Medication Renewal Request    Cruz Hammonds would like a refill of the following medications:      AmLODIPine Besylate 10 MG Oral Tab Rupert Fountain MD]    Preferred pharmacy: [facility]

## 2018-09-07 NOTE — TELEPHONE ENCOUNTER
From: Harsh Castillo  Sent: 9/7/2018 1:15 PM CDT  Subject: Medication Renewal Request    Volodymyr Santana would like a refill of the following medications:     hydrochlorothiazide 25 MG Oral Tab Shreyas Jim MD]    Preferred pharmacy: [facility]

## 2018-09-07 NOTE — TELEPHONE ENCOUNTER
Hydrochlorothiazide 25 mg 1 tab daily filled 5-14-18 90 with 0 refills     LOV 4-19-18      HTN - Stable on prescription medication. Due for updated labs.      RTC 6 weeks    Labs

## 2018-10-09 NOTE — PROGRESS NOTES
Breast Surgery Surveillance    History of Present Illness:   Ms. Hola Gaviria is a 50year old woman who presented with self detected pain in the right breast and was last seen in July 2018.   She denies any palpable masses, skin changes or axillar supracervical- still has cervix and ovaries   • LIPOSUCTION FLANKS Bilateral 3/24/2015    Performed by Chris Huang MD at [address] [facility], [facility] N/A 3/24/2015    Performed by Eliana Ruiz at [facility] Oral Tab Take 1 tablet (5 mg total) by mouth every 12 (twelve) hours as needed for Anxiety. Disp: 60 tablet Rfl: 5   Azelastine HCl 0.1 % Nasal Solution 2 sprays by Nasal route 2 (two) times daily.  Disp: 1 Bottle Rfl: 11   hydrocortisone (PROCTOSOL HC) 2.5 pressure/discomfort, palpitations, irregular heartbeat, fainting or near-fainting, difficulty breathing when lying flat, SOB/Coughing at night, swelling of the legs or chest pain while walking.     Breasts:  See history of present illness    Randal SpO2 100%   BMI 22.60 kg/m²     Physical Exam:  The patient is an alert, oriented, well-nourished and  well-developed woman who appears her stated age. Her speech patterns and movements are normal. Her affect is appropriate.     HEENT: The head is normoceph bilaterally. I recommended a targeted ultrasound of the right breast and possible repeat aspiration as needed. Targeted bilateral breast ultrasound   Focused sonography in the areas of previous cyst were performed.   At the 10 o'clock position, 2 cm fr appointment. This encounter lasted a total of 30 minutes, more than 50% of which was dedicated to the discussion of management options.

## 2018-10-17 NOTE — TELEPHONE ENCOUNTER
Requesting TRAMADOL HCL 50 MG Oral Tab  LOV: 5/31/18  RTC: 4 months  Filled: 9/08/18 #60 with 0 refills    Future Appointments   Date Time Provider Kellen Elina   10/30/2018  1:30 PM Nasrin Ortega MD [facility] [place]

## 2018-10-30 PROBLEM — R41.3 MEMORY DISORDER: Status: ACTIVE | Noted: 2018-10-30

## 2018-10-30 NOTE — PROGRESS NOTES
Patient presents with: Other: mental health disorder, memory disorder, chronic allergic rhinitis      HPI: Susy Rodriguez presents today for eval of multiple concerns as follows:  1.  Mental health disorder - Specifically, she feels grossly overwhelmed in her life by mouth daily. , Disp: 90 tablet, Rfl: 0  •  TiZANidine HCl 2 MG Oral Tab, Take 1 tablet (2 mg total) by mouth every 6 (six) hours as needed (Back spasms). , Disp: 30 tablet, Rfl: 0  •  RANITIDINE  MG Oral Tab, TAKE ONE TABLET BY MOUTH IN THE EVENING Needs resources. Send to [facility] outpatient assessment. 2. Memory disorder - Ongoing for many months now. There are lapses in the short-term memory. She did have imaging which was unremarkable. She did see Neurology in May (Dr. Beatriz Noel).   No new

## 2018-11-16 NOTE — TELEPHONE ENCOUNTER
Medication(s) to Refill:   Requested Prescriptions     Pending Prescriptions Disp Refills   • TIZANIDINE HCL 2 MG Oral Tab [Pharmacy Med Name: TiZANidine HCl Oral Tablet 2 MG] 30 tablet 0     Sig: TAKE ONE TABLET BY MOUTH EVERY SIX HOURS AS NEEDED (BACK SP

## 2018-11-23 NOTE — TELEPHONE ENCOUNTER
- Pt states she has enough medication but would like refills for the future.      Refill requested:   Requested Prescriptions     Pending Prescriptions Disp Refills   • TiZANidine HCl 2 MG Oral Tab [Pharmacy Med Name: TiZANidine HCl Oral Tablet 2 MG] 30 tab

## 2018-11-23 NOTE — TELEPHONE ENCOUNTER
Refill requested:   Requested Prescriptions     Pending Prescriptions Disp Refills   • HYDROCHLOROTHIAZIDE 25 MG Oral Tab [Pharmacy Med Name: HydroCHLOROthiazide Oral Tablet 25 MG] 90 tablet 0     Sig: TAKE ONE TABLET BY MOUTH ONE TIME DAILY   • AMLODIPINE

## 2018-12-13 NOTE — TELEPHONE ENCOUNTER
Requesting TraMADol HCl 50 MG Oral Tab  LOV: 10/31/18  RTC: 3 months  Filled: 10/18/18 #60 with 0 refills    Future Appointments   Date Time Provider Kellen Antoine   1/31/2019  1:30 PM Evon Alarcon MD [facility] [place]

## 2018-12-17 NOTE — TELEPHONE ENCOUNTER
Tizanidine 2 mg 1 cap q 6 prn filled 11-23-18 30 with 1 refill     LOV 10-30-18      RTC 3 months    Next apt 1-31-19     Labs

## 2018-12-24 RX ORDER — TIZANIDINE 2 MG/1
2 TABLET ORAL EVERY 6 HOURS PRN
Qty: 30 TABLET | Refills: 2 | Status: SHIPPED | OUTPATIENT
Start: 2018-12-24 | End: 2019-01-26

## 2018-12-26 ENCOUNTER — TELEPHONE (OUTPATIENT)
Dept: PAIN CLINIC | Facility: CLINIC | Age: 48
End: 2018-12-26

## 2018-12-26 DIAGNOSIS — M54.16 LUMBAR RADICULOPATHY: Primary | ICD-10-CM

## 2018-12-26 DIAGNOSIS — M47.894 THORACIC FACET SYNDROME: ICD-10-CM

## 2018-12-26 NOTE — TELEPHONE ENCOUNTER
Spoke to Sriram at [facility] Choice [facility] [address] are valid and billable, no prior authorization or predetermination required.    Call reference: [id number]  Call time 18:06

## 2018-12-26 NOTE — TELEPHONE ENCOUNTER
Medical clearance needed- no    Pt seen in OV today by Dr. Scottie Bro and recommended for LESI and RFA (X 4). Please begin PA process for procedure(s). Laterality: LESI x3  Level(s): n/a    Laterality: right RFA  Level(s): thoracic medial branch    Pt informed callback will be given when PA feedback received and procedure date to be scheduled after approval.  All procedural instructions reviewed, procedure line number provided. Pt verbalized understanding and agreeable to plan. Copy of instructions provided.     Implanted cardiac device/SCS/PNS: no

## 2018-12-29 NOTE — PROGRESS NOTES
Name: Chapito Thomas   : 2/10/1970   DOS: 2018     Pain Clinic Follow Up Visit:   Chapito Thomas is a 50year old female who presents for recheck of her chronic shoulder, neck, thoracic and low back pain.   She is status post bilatera mouth every 6 (six) hours as needed (Back spasms). Disp: 30 tablet Rfl: 0   RANITIDINE  MG Oral Tab TAKE ONE TABLET BY MOUTH IN THE EVENING  Disp: 30 tablet Rfl: 5   Azelastine HCl 0.1 % Nasal Solution 2 sprays by Nasal route 2 (two) times daily.  Fletcher Rincon today:  Requested Prescriptions      No prescriptions requested or ordered in this encounter       Radiology orders and consultations:None    The patient indicates understanding of these issues and agrees to the plan. No Follow-up on file.     Jaci BESS

## 2019-01-02 NOTE — TELEPHONE ENCOUNTER
Started Pa for first [address] approved   [id number]  1/2/19-3/3/19  First lesi can be scheduled

## 2019-01-03 NOTE — TELEPHONE ENCOUNTER
Called patient and scheduled for:    1/15/19 at 1530. Pre-procedure instructions sent via Lexdir. Patient verbalized understanding.

## 2019-01-10 NOTE — TELEPHONE ENCOUNTER
Spoke to patient, confirmed procedure date of 1/15/19 and to be checked in at outpatient registration at 2:30 pm. Patient called pre-procedure line and understood instructions. Patient instructed to call office if there are additional questions .

## 2019-01-15 NOTE — H&P
History & Physical Examination    Patient Name: Wilberto Grant  MRN: [id number]  [facility]: [id number]  YOB: 1970    Pre-Operative Diagnosis:  Lumbar radiculopathy [M54.16]    Present Illness: A 50year old female with low back pain is here f RASH  Band-Aid Anti-Itch      RASH    Comment:Band aid glue  Midrin [Apap-Isomet*    HIVES  Mold                      Pollen                    [place], [place]            Past Medical History:   Diagnosis Date   • Anemia    • Anxiety    • Chronic rhinit History   Problem Relation Age of Onset   • Heart Disorder Mother    • Cancer Neg      Social History    Tobacco Use      Smoking status: Never Smoker      Smokeless tobacco: Never Used    Alcohol use:  Yes      Alcohol/week: 0.0 oz      Comment: Socially

## 2019-01-15 NOTE — TELEPHONE ENCOUNTER
Spoke to pt regarding vit D, biotin, and amlodipine. Answered all questions. Verbalized understanding.

## 2019-01-15 NOTE — OPERATIVE REPORT
[facility]  Operative Report  1/15/2019     Ana Song Patient Status:  Hospital Outpatient Surgery    2/10/1970 MRN [id number]   [facility] SURGERY Attending Veda Rod MD   Hosp Day # 0 PCP MD Edith Roberts [facility] epidurogram was obtained. Thereafter, dexamethasone 10 mg with normal saline of 5 mL in total volume of 6 mL was injected through the Tuohy needle. The needle was flushed with 1 mL lidocaine. The needle was withdrawn with the stylet intact in situ.   The

## 2019-01-21 NOTE — PROGRESS NOTES
Tramadol script printed. Chloe Garcia. Radha Alves MD  Diplomate, American Board of Internal Medicine  [address] [address] [address]  Critical access hospital0 [facility],4Th Floor, [address], [facility], [address]  T: [phone]; F: [address]

## 2019-01-21 NOTE — TELEPHONE ENCOUNTER
From: Harsh Castillo  To: Patricio Patel MD  Sent: 1/21/2019 4:42 PM CST  Subject: Other    Hello Dr. Judd Mccoy, on my last visit we spoke of you allowing me to have the Tramadol 3 times daily.  I requested a refill at [address] [place] [address] and they do not have the updated

## 2019-01-26 NOTE — TELEPHONE ENCOUNTER
Refill requested:   Requested Prescriptions     Pending Prescriptions Disp Refills   • TIZANIDINE HCL 2 MG Oral Tab [Pharmacy Med Name: TiZANidine HCl Oral Tablet 2 MG] 30 tablet 1     Sig: TAKE ONE TABLET BY MOUTH EVERY SIX HOURS AS NEEDED       Failed pr

## 2019-01-29 NOTE — TELEPHONE ENCOUNTER
Called patient to ask if she received any pain relief. Patient started to express frustrations and complaints about her previous procedure and asked to be called back.

## 2019-01-31 NOTE — TELEPHONE ENCOUNTER
Called patient and scheduled for:    Appointment Date:  2/19/19  Procedure Time:  230 pm  Check-in Time:  130 pm

## 2019-02-14 NOTE — TELEPHONE ENCOUNTER
Left message for patient to call back to confirm procedure date of 2/19/19 with Dr Teresa Lofton and to be checked in at outpatient registration at 1:30 pm. Patient to be instructed to call pre-procedure line before procedure at [phone].  Patient to be instruct

## 2019-02-19 NOTE — OPERATIVE REPORT
[facility]  Operative Report  2019     Reginald Membreno Patient Status:  Hospital Outpatient Surgery    2/10/1970 MRN [id number]   [facility] ENDOSCOPY Attending Amber Orellana MD   Hosp Day # 0 PCP MD Sawyer Lucasi was confirmed with AP and lateral view under fluoroscopy. Omnipaque 180 was injected in 1 mL volume. A good epidurogram was obtained.   Thereafter, dexamethasone 10 mg with normal saline of 5 mL in total volume of 6 mL was injected through the Tuohy needle

## 2019-02-19 NOTE — H&P
History & Physical Examination    Patient Name: Aldo Villar  MRN: [id number]  CSN: [id number]  YOB: 1970    Pre-Operative Diagnosis:  Lumbar radiculopathy [M54.16]    Present Illness: A 52year old female with low back pain is here f Pollen                    [place], [place]            Past Medical History:   Diagnosis Date   • Anemia    • Anxiety    • Chronic rhinitis    • Migraines    • Multinodular thyroid 9/16/2016   • Raynaud disease    • Unspecified essential hypertension Problem Relation Age of Onset   • Heart Disorder Mother    • Cancer Neg      Social History    Tobacco Use      Smoking status: Never Smoker      Smokeless tobacco: Never Used    Alcohol use:  Yes      Alcohol/week: 0.0 oz      Comment: Socially      SYST

## 2019-02-27 NOTE — TELEPHONE ENCOUNTER
Protocol passed    Medication(s) to Refill:   Requested Prescriptions     Pending Prescriptions Disp Refills   • HYDROCHLOROTHIAZIDE 25 MG Oral Tab [Pharmacy Med Name: hydroCHLOROthiazide Oral Tablet 25 MG] 90 tablet 0     Sig: TAKE ONE TABLET BY MOUTH ONE

## 2019-03-05 NOTE — TELEPHONE ENCOUNTER
Spoke with patient. She reports 95 % pain relief from previous injection. Still continues to have pain occasionally. Patient states she is able to do more since her injections.

## 2019-03-07 ENCOUNTER — TELEPHONE (OUTPATIENT)
Dept: INTERNAL MEDICINE CLINIC | Facility: CLINIC | Age: 49
End: 2019-03-07

## 2019-03-07 NOTE — TELEPHONE ENCOUNTER
Pt called to reschedule appt due to her grandson came home sick from school. Pt is completely out of meds. Appt is 4/2/19. Please refill HYDROCHLOROTHIAZIDE and AMLODIPINE BESYLATE until appt.

## 2019-03-11 NOTE — TELEPHONE ENCOUNTER
Called patient and scheduled for:    Appointment Date:  4/2/19  Procedure Time:  245pm  Check-in Time:  145 pm    Patient states she has pre-op instructions and no questions. Patient verbalized understanding.

## 2019-03-28 NOTE — TELEPHONE ENCOUNTER
Spoke to patient, confirmed procedure date of 4/2/19 and to be checked in at outpatient registration at 1:45 pm. Patient called pre-procedure line and understood instructions. Patient instructed to call office if there are additional questions .

## 2019-04-01 NOTE — PROGRESS NOTES
Patient presents with: Follow - Up: 6-months for chronic medical conditions       HPI: Danna Mckeon presents today for 6-month f/u select chronic medical conditions as follows:    1. HTN - Stable on prescription medication. No new issues. 2.  GAGE - Stable on p TIME DAILY IN THE EVENING, Disp: 30 tablet, Rfl: 4  •  DIAZEPAM 5 MG Oral Tab, TAKE ONE TABLET BY MOUTH EVERY TWELVE HOURS AS NEEDED FOR ANXIETY , Disp: 60 tablet, Rfl: 5  •  TiZANidine HCl 2 MG Oral Tab, Take 1 tablet (2 mg total) by mouth every 6 (six) h TFTs.  No new issues. Testing ordered. 5. Chronic allergic rhinitis 2/2 tree pollen allergy - Stable on prescription medication. No new issues. 6. RTC 6 months. Antonio Fabian verbally agrees to and understands the plan as outlined above.   She was also afforde

## 2019-04-02 NOTE — H&P
History & Physical Examination    Patient Name: Susy Pelaez  MRN: [id number]  [facility]: [id number]  YOB: 1970    Pre-Operative Diagnosis:  Lumbar radiculopathy [M54.16]    Present Illness: A 52year old female with low back pain is here f [Apap-Isomet*    HIVES  Adhesive Tape           RASH  Band-Aid Anti-Itch      RASH    Comment:Band aid glue  Mold                      Pollen                    [place], [place]            Past Medical History:   Diagnosis Date   • Anemia    • Anxiety    • by Zaira Lepe MD at [facility] MAIN OR   • SUBDELTOID BURSA INJECTION BILATERAL Bilateral 10/31/2016    Performed by Zaira Lepe MD at [address]   • TOTAL ABDOM HYSTERECTOMY  September 2013   • TUBAL LIGATION  2002     Family History   Problem Relati

## 2019-04-04 NOTE — OPERATIVE REPORT
[facility]  Operative Report  2019     Jarret Augirre Patient Status:  Hospital Outpatient Surgery    2/10/1970 MRN [id number]   [facility] ENDOSCOPY Attending No att. providers found   Hosp Day # 0 PCP Alex Tavarez MD     In lateral view under fluoroscopy. Omnipaque 180 was injected in 1 mL volume. A good epidurogram was obtained. Thereafter, dexamethasone 10 mg with normal saline of 5 mL in total volume of 6 mL was injected through the Tuohy needle.   The needle was flushed

## 2019-05-03 NOTE — TELEPHONE ENCOUNTER
Failed protocol     Last refill:  1/21/2019 #90 NR    LOV   4/1/2019 Dr Nesbitt He RTC 6 months     FOV scheduled 10. 1.2019

## 2019-06-03 NOTE — TELEPHONE ENCOUNTER
Hydrochlorothiazide 25 mg 1 tab daily filled 2-27-19 90 with 0 refills   Amlodipine 10 mg 1 tab daily filled 2-27-19 90 with 0 refills     LOV 4-1-19    RTC 6 months  Next apt 10-1-19   Labs 6-6-18

## 2019-09-07 RX ORDER — HYDROCHLOROTHIAZIDE 25 MG/1
TABLET ORAL
Qty: 90 TABLET | Refills: 0 | Status: SHIPPED | OUTPATIENT
Start: 2019-09-07 | End: 2019-12-03

## 2019-09-07 RX ORDER — AMLODIPINE BESYLATE 10 MG/1
TABLET ORAL
Qty: 90 TABLET | Refills: 1 | Status: SHIPPED | OUTPATIENT
Start: 2019-09-07 | End: 2020-02-17

## 2019-10-28 NOTE — PROGRESS NOTES
Patient presents with:  Medication Follow-Up      HPI: Volodymyr Moody presents today for ongoing allergies. She's been working w/ Dr. Jayson Palmer from [facility]. She's now been advised to see ENT. Needs recommendation from me.     Review of Systems   All other system Disp: 30 tablet, Rfl: 0  •  ketoconazole (NIZORAL) 2 % Apply Externally Cream, Apply  topically as needed. Daily, Disp: , Rfl:   •  selenium sulfide (SELSUN) 2.5 % Apply Externally Lotion, Apply  topically daily as needed (tinea versicolor). , Disp: , Rfl:

## 2019-10-30 NOTE — TELEPHONE ENCOUNTER
Chriss Saldivar MD  [facility] Clinical Staff             Please fax my note to Dr. Jordan Holman from [facility]. Lieutenant Pennington.  Roselia Posey MD   Diplomate, [address] [place] [address] Board of Internal Medicine   Member, Alo Crump [address]3

## 2019-12-04 NOTE — TELEPHONE ENCOUNTER
hydrochlorothiazide 25 mg 1 tab daily filled 9/7/19 90 with 0 refills     LOV 10/8/19  RTC PRN.   No upcoming apt on file   Labs 7/1/19
no neck mass

## 2019-12-17 NOTE — PROGRESS NOTES
Patient presents with: Anxiety: and restlessness      HPI: Pamela Huffman presents today for ongoing mood disorder.   In particular, she's been struggling w/ mixed emotions, restlessness, and insomnia due to the fact that her grandchildren will be moving to [place] total) by mouth every 8 (eight) hours as needed for Pain., Disp: 90 tablet, Rfl: 0  •  diazepam 5 MG Oral Tab, TAKE ONE TABLET BY MOUTH EVERY TWELVE HOURS AS NEEDED FOR ANXIETY, Disp: 60 tablet, Rfl: 5  •  TiZANidine HCl 2 MG Oral Tab, Take 1 tablet (2 mg Lifestyle Medicine  [address] [facility]  [address] [facility],[address] Floor, [address], [facility], [address]  T: [phone]; F: [address]     Meds & Refills for this Visit:  Requested Prescriptions     Signed Prescriptions Disp Refills   • Zolpidem Tartrate 10 MG Or

## 2020-02-17 PROBLEM — M46.99 INFLAMMATORY SPONDYLOPATHY OF MULTIPLE SITES IN SPINE (HCC): Status: ACTIVE | Noted: 2017-04-10

## 2020-02-17 PROBLEM — M47.814 THORACIC ARTHRITIS: Status: RESOLVED | Noted: 2017-04-10 | Resolved: 2020-02-17

## 2020-02-17 PROBLEM — R41.3 MEMORY DISORDER: Status: RESOLVED | Noted: 2018-10-30 | Resolved: 2020-02-17

## 2020-02-17 PROBLEM — M54.16 CHRONIC LUMBAR RADICULOPATHY: Status: RESOLVED | Noted: 2017-04-10 | Resolved: 2020-02-17

## 2020-02-17 PROBLEM — M51.36 BULGING LUMBAR DISC: Status: RESOLVED | Noted: 2017-04-10 | Resolved: 2020-02-17

## 2020-02-17 PROBLEM — D64.9 NORMOCYTIC ANEMIA: Status: RESOLVED | Noted: 2017-10-17 | Resolved: 2020-02-17

## 2020-02-17 PROBLEM — M51.369 BULGING LUMBAR DISC: Status: RESOLVED | Noted: 2017-04-10 | Resolved: 2020-02-17

## 2020-02-17 PROBLEM — D63.8 ANEMIA OF CHRONIC DISEASE: Status: RESOLVED | Noted: 2018-05-09 | Resolved: 2020-02-17

## 2020-02-17 PROBLEM — M47.816 ARTHRITIS, LUMBAR SPINE: Status: RESOLVED | Noted: 2017-04-10 | Resolved: 2020-02-17

## 2020-02-17 PROBLEM — M46.99 INFLAMMATORY SPONDYLOPATHY OF MULTIPLE SITES IN SPINE: Status: ACTIVE | Noted: 2017-04-10

## 2020-02-17 NOTE — TELEPHONE ENCOUNTER
[place] calling with drug interaction between tramadol and amitriptyline. Risk of serotonin syndrome. Call pharmacy to approve/change med.   Speak to tech or pharmacist on duty

## 2020-02-17 NOTE — PROGRESS NOTES
Patient presents with: Follow - Up: HTN and other concerns      HPI: Tunde Lewis presents today for 2-month f/u HTN, but she's also here for chronic neck & back pain, GAGE, and multinodular thyroid. 1. HTN - Stable on prescription medication. No new issues. tiZANidine HCl 2 MG Oral Tab, Take 1 tablet (2 mg total) by mouth every 6 (six) hours as needed (Back spasms). , Disp: 30 tablet, Rfl: 0  •  Fexofenadine HCl 180 MG Oral Tab, Take 180 mg by mouth daily. 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 HTN - Stable on prescription medication.   No new issue Consults:  US THYROID (VEA=[id number])

## 2020-02-18 NOTE — TELEPHONE ENCOUNTER
I am aware of risk, which is low. She's been on this combination in the past and has tolerated it well. Valerie Lima. Amanda Casas MD  Diplomate, American Board of Internal Medicine  Member, [employer] of [address]  [address]

## 2020-02-26 NOTE — TELEPHONE ENCOUNTER
Last OV: 2/17/2020 with Dr. Gerson Concepcion  Last refill date: 4/1/19     #/refills: #1 Bottle, 11 refills  When pt was asked to return for OV: 6 months  Upcoming appt: 7/17/2020 with Dr. Gerson Concepcion  Last labs July 2019

## 2020-03-20 NOTE — TELEPHONE ENCOUNTER
Please fax her most recent thyroid ultrasound report dated 3/18/20 to her ENT, Dr. Sudhakar Vila. Patient herself will f/u w/ ENT for an appointment. Munir Jewell.  Jeff Ho MD  Diplomate, [address] [place] [address] Board of Internal Medicine  Member, [address]

## 2020-03-20 NOTE — TELEPHONE ENCOUNTER
Patient calling to request a call from doctor; she is concerned about her thyroid test results US result.  Explained in detail either nurse callback or we are doing telephone doctor visits as we would normally schedule a follow up in office with Dr Nesbitt He; s

## 2020-06-30 NOTE — TELEPHONE ENCOUNTER
Passed protocol     Last refill:  12/4/2019 HCTZ 25 mg #90 1R    LOV:   2/17/2020 Dr Cleveland Marie RTC 6 months  FOV scheduled 7/17/2020

## 2020-07-17 NOTE — PROGRESS NOTES
Patient presents with: Other: Mood disorder; stress due to marital problems      HPI: Roxie Peterson presents for mood disorder and stress due to marital problems. Longstanding hx. Things have just really been coming to a head recently.  Has felt estranged from her TABLET BY MOUTH ONCE DAILY, Disp: 90 tablet, Rfl: 0  •  AZELASTINE HCL 0.1 % Nasal Solution, INHALE 2 SPRAYS BY NASAL ROUTE TWO TIMES A DAY , Disp: 30 mL, Rfl: 11  •  Amitriptyline HCl 25 MG Oral Tab, , Disp: , Rfl:   •  amLODIPine Besylate 10 MG Oral Tab, kg)  08/28/19 : 157 lb (71.2 kg)  Gen - A&Ox3, NAD  HEENT - PERRL, EOMI, OP is clear  Neck - supple, no JVD, no thyromegaly  Lungs - CTAB  CV - RRR, nl s1, s2  Abd - soft, NABS, NT, ND  Ext - no c/c/e  Neuro - CNs 2-12 grossly intact, no focal deficits; 2+

## 2020-07-19 PROBLEM — Z63.0 STRESS DUE TO MARITAL PROBLEMS: Status: ACTIVE | Noted: 2020-07-19

## 2020-07-19 PROBLEM — F39 MOOD DISORDER (HCC): Status: ACTIVE | Noted: 2020-07-19

## 2020-07-19 PROBLEM — F39 MOOD DISORDER: Status: ACTIVE | Noted: 2020-07-19

## 2020-07-20 NOTE — TELEPHONE ENCOUNTER
[address] (phone #[phone]); spoke to pharmacy rep Cortney Alaniz to initiate prior authorization for Viibryd 10 mg and 20 mg. Awaiting prior authorization form to be faxed to our office.       Patient's member ID #[id number]

## 2020-07-20 NOTE — TELEPHONE ENCOUNTER
Kofi Kevin, Shane Maharaj MD 1 hour ago (1:57 PM)         Hello. My prescription for Delman Carly is not clearing with my insurance. Also, I have mosquito bites that are mutating.  I've taken Claritin, Benadryl used hydrocortisone and aloe vera with l

## 2020-07-21 NOTE — TELEPHONE ENCOUNTER
From: Florencia Salcedo  To: Jose Bartlett MD  Sent: 7/21/2020 1:23 PM CDT  Subject: Other    Dr. Vannessa Erickson, I have called and left a message.  I just tried calling again and the person that answered my called looked at the message that I sent for you and or

## 2020-07-21 NOTE — TELEPHONE ENCOUNTER
This was already addressed by me. Erin Rae. Rockne Najjar, MD  Diplomate, American Board of Internal Medicine  Member, [employer] of [address] [facility]  [address]  [facility],4Th Floor, [address], [facility], [address] [place] [address]  T: [phone]; F: [phone]

## 2020-07-21 NOTE — TELEPHONE ENCOUNTER
Pt called to F/U on request for medication and than flipped out about me being in her chart and reading her My Cart messages (was not reading her my chart message) and than she was yelling at me to get out of it and why I was looking at the picture that sh

## 2020-07-21 NOTE — TELEPHONE ENCOUNTER
1. Medrol dosepak given. 2. Escitalopram script sent since Vilazodone not covered. She is on minimal use of Tramadol and Meloxicam. I am aware of potential drug interaction. Maico Sanchez.  Lisha Chavis MD  Diplomate, [address] [place] [address] Board of Internal Medicine  Member, Lee Mtz

## 2020-08-20 NOTE — PATIENT INSTRUCTIONS
Rufina,    1. Continue the Lexapro in the evening as you are doing. 2. Start Adderall 10 mg each morning to see if this balances out mood and function. 3. We will connect again in 1 month for follow up.     Kind regards,  Dr. Jovita Queen

## 2020-08-20 NOTE — PROGRESS NOTES
Patient presents with: Follow - Up       HPI: Royal Woods presents today for 1-month f/u mood disorder and 6-months for insomnia. Stable on the latter. She was started on Escitalopram 20 mg last month for her mood disorder. She takes this in the evening.  George Tao 1-month trial of Adderall 10 mg q AM. Continue Escitalopram as scheduled. 2. Adjustment insomnia - Stable on prescription medication + lifestyle measures. No new issues. Refill given on her Zolpidem. 3. RTC 1 month.  Rufina verbally agrees to and Leroy

## 2020-09-21 PROBLEM — F98.8 ATTENTION DEFICIT DISORDER (ADD) WITHOUT HYPERACTIVITY: Status: ACTIVE | Noted: 2020-09-21

## 2020-09-21 NOTE — PROGRESS NOTES
Patient presents with: Follow - Up: 1 month follow up       HPI: Dustin Neil presents today for 1-month f/u ADD, mood disorder, and GAGE. After her last visit w/ me I started her on Adderall 10 mg q AM. This has helped a lot.  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 ADD - Increase Adderall to 10 mg BID. Continue support measures.   2. Mood disorder and GAGE - Stable on prescription m

## 2020-10-08 NOTE — TELEPHONE ENCOUNTER
Failed protocol     Last refill:  7/21/2020 Escitalopram 20 mg #90 NR    LOV:   9/21/2020 Dr Radha Alves RTC 1 month  2. Mood disorder and GAGE - Stable on prescription medication + lifestyle measures. No new issues.    FOV scheduled 10/23/2020

## 2020-10-23 NOTE — PATIENT INSTRUCTIONS
Dear Pamela Huffman,    Thank you for expressing interest in learning more about my new medical practice.  Your choice to be a member of my [facility]-affiliated practice, in partnership with [facility], is an investment in your health and well-being that can

## 2020-10-25 PROBLEM — F11.20 UNCOMPLICATED OPIOID DEPENDENCE (HCC): Status: ACTIVE | Noted: 2020-10-25

## 2020-10-25 NOTE — PROGRESS NOTES
Patient presents with:  Medication Follow-Up      HPI: Susy Rodriguze presents for medication follow up as it relates to multiple chronic select medical conditions as follows:    1. ADD - Stable on prescription medication + lifestyle measures. No new issues.    2. G (ADDERALL) 10 MG Oral Tab, Take 1 tablet (10 mg total) by mouth 2 (two) times daily. , Disp: 60 tablet, Rfl: 0  •  [START ON 11/23/2020] amphetamine-dextroamphetamine (ADDERALL) 10 MG Oral Tab, Take 1 tablet (10 mg total) by mouth 2 (two) times daily. , Disp soft, NABS, NT, ND  Ext - no c/c/e  Neuro - CNs 2-12 grossly intact, no focal deficits; 2+ DTRs  Psych - nl mood/affect      A/P:  Attention deficit disorder (add) without hyperactivity  (primary encounter diagnosis)  Berry (generalized anxiety disorder)  Es Tab 60 tablet 5     Sig: TAKE ONE TABLET BY MOUTH EVERY TWELVE HOURS AS NEEDED FOR ANXIETY   • tiZANidine HCl 2 MG Oral Tab 30 tablet 0     Sig: Take 1 tablet (2 mg total) by mouth every 6 (six) hours as needed (Back spasms).    • amphetamine-dextroamphetam

## 2021-01-04 NOTE — TELEPHONE ENCOUNTER
Medication(s) to Refill:   Requested Prescriptions     Pending Prescriptions Disp Refills   • ESCITALOPRAM 20 MG Oral Tab [Pharmacy Med Name: Escitalopram Oxalate 20 Mg Tab Solc] 90 tablet 0     Sig: TAKE ONE TABLET BY MOUTH ONE TIME DAILY FOR DEPRESSION A

## 2021-01-25 NOTE — TELEPHONE ENCOUNTER
Refills pending, routing to [facility] pino per alphabet coverage. LOV: 10/23/2020  F/U discussed: 3 months  Last Filled: 12/24/2020 #60 R: 0 (adderall); 2/26/2020 #30mL R: 11 (azelastine)    No future appointments.     Patient sent a [employer] request message to ref

## 2021-01-26 NOTE — TELEPHONE ENCOUNTER
If pt has not received notice prior will send 1 month then will need to see psychiatry for further refills.

## 2021-01-26 NOTE — TELEPHONE ENCOUNTER
Azelastine sent, no adderral as I have never met her and she cannot come in for visit, will need to call psych as per IM letter

## 2021-01-26 NOTE — PROGRESS NOTES
Hematology/Oncology Clinic Follow Up Visit    Patient Name: April Moreno  Medical Record Number: [id number]    YOB: 1970    PCP: Dr. Tej Duffy  Other providers:  Dr. Orlando Baumann, Dr. Dewayne Del Toro, Dr. Quiroga for Co spontaneously without causes. Not precipitated by cold, certain activities or events. No bleeding. No fevers or night sweats. No history of frequent or severe infections. She denies any adenopathy. Reports fatigue has been severe.  She reports that sh Oral Tab CR TAKE ONE TABLET BY MOUTH TWICE DAILY  Disp: 180 tablet Rfl: 0   Azelastine HCl 0.1 % Nasal Solution 2 sprays by Nasal route 2 (two) times daily.  Disp: 1 Bottle Rfl: 11   hydrochlorothiazide 25 MG Oral Tab Take 1 tablet (25 mg total) by mouth da Heart Disorder Mother    • Cancer Neg    No family history of cancer, autoimmune or rheumatologic disease    Review of Systems:  A 10-point ROS was done with pertinent positives and negative per the HPI    Vital Signs:  Height: --  Weight: 64.9 kg (143 lb) CO2 28.0 04/27/2018     No results found for: PTT, PT, INR      Lab Results  Component Value Date   REITCPERCENT 1.4 04/27/2018   REITCPERCENT 1.5 10/17/2017       Lab Results  Component Value Date   RETHE 33.3 04/27/2018   RETHE 32.8 10/17/2017       La 0.10 - 0.30 g/dL 0.18   ALPHA-2 GLOBULIN      0.60 - 1.00 g/dL 0.71   BETA GLOBULIN      0.70 - 1.20 g/dL 0.66 (L)   GAMMA GLOBULIN      0.60 - 1.60 g/dL 1.04   A/G RATIO       1.74   PROTEIN ELECT INTERPRETATION       No apparent monoclonal protein on s Yes-Patient/Caregiver accepts free interpretation services... improving over the last several months. Extensive lab workup as above is unrevealing. I suspect is most likely related to mild marrow suppression from anemia of chronic disease related to her underlying autoimmune disease.   -EPO level is pending  -Given

## 2021-01-26 NOTE — TELEPHONE ENCOUNTER
My chart message send to patient by [facility] on 1/25/21 informing our ADHD medication protocol.

## 2021-04-05 NOTE — TELEPHONE ENCOUNTER
Failed protocol     Last refill:  10/23/2020 Tramadol 50 mg #90 NR  10/23/2020 Tizanidine 2 mg #30 NR  9/21/2020 hydrochlorothiazide 25 mg #90 1R    LOV:   10/23/2020 Dr Jeff Ho RTC PRN  3. HTN - Stable on prescription medication + lifestyle measures.  No new

## 2021-04-05 NOTE — TELEPHONE ENCOUNTER
Future Appointments   Date Time Provider Kellen Antoine   4/13/2021  7:00 AM MASTER Gómez [facility] [place]

## 2021-05-06 PROBLEM — N39.46 MIXED STRESS AND URGE URINARY INCONTINENCE: Status: ACTIVE | Noted: 2021-05-06

## 2021-05-06 PROBLEM — F33.0 MILD EPISODE OF RECURRENT MAJOR DEPRESSIVE DISORDER (HCC): Status: ACTIVE | Noted: 2021-05-06

## 2021-05-06 PROBLEM — F33.0 MILD EPISODE OF RECURRENT MAJOR DEPRESSIVE DISORDER: Status: ACTIVE | Noted: 2021-05-06

## 2021-05-06 NOTE — PROGRESS NOTES
Angy Mcfadden is a 46year old female who presents for a complete physical exam.   HPI:   Pt presents for annual wellness screening. HTN - takes hydrochlorothiazide daily, no SEs.   Home BP readings in the 120-130s/90s -- unsure if her cuff is ac Pain. 90 tablet 0   • diazepam 5 MG Oral Tab TAKE ONE TABLET BY MOUTH EVERY TWELVE HOURS AS NEEDED FOR ANXIETY 60 tablet 5   • tiZANidine HCl 2 MG Oral Tab Take 1 tablet (2 mg total) by mouth every 6 (six) hours as needed (Back spasms).  30 tablet 0   • Fex Drug use: No        REVIEW OF SYSTEMS:   GENERAL: denies fever, chills, night sweats, sig change in weight  SKIN: denies any unusual skin lesions or rashes  EYES: denies changes in vision  HEENT: denies nasal congestion, drainage, sore throat  LUNGS: denie exam, and dental check ups q 6 months. # HTN: controlled, cont hydrochlorothiazide. # Raynaud's: stable off medication (previously on amlodipine). # Mixed urinary incontinence: referred to pelvic floor therapy.   # Allergic rhinitis: stable on OTC antihi

## 2021-05-06 NOTE — PATIENT INSTRUCTIONS
Blood work today.     Please use Potential or call Alfredo Blue at [phone] to set up the following tests:  - thyroid ultrasound -- do this now  - mammogram -- do this in November    Behavioral health with reach out to you for a psyc

## 2021-05-28 NOTE — TELEPHONE ENCOUNTER
----- Message from [place] sent at 5/26/2021  5:35 AM CDT -----  Thyroid nodules are stable in size though two of them are >1 cm in size thus rec f/u with ENT to consider bx and determine need for f/u imaging schedule.

## 2021-08-18 NOTE — TELEPHONE ENCOUNTER
Failed protocol   Passed protocol - azelastine    Last refill:  Azelastine HCl 0.1 % Nasal Solution 30 mL 0 2021    Si sprays by Nasal route 2 (two) times daily.        diazepam 5 MG Oral Tab 60 tablet 5 10/23/2020    Sig:   TAKE ONE TABLET BY MO

## 2021-08-19 NOTE — TELEPHONE ENCOUNTER
Shyla from [facility] called and stated that the patient called to schedule a thyroid biopsy and no orders are in the system. Please order and call patient back to contact central scheduling to set up biopsy.

## 2021-08-19 NOTE — TELEPHONE ENCOUNTER
Spoke with patient. Thyroid bx was not recommended-- patient was to follow-up with ENT who would determine need for f/u imaging. Patient made aware and verbalized understanding.  [employer] message sent to patient with contact information for ENT, dietician

## 2021-08-26 NOTE — PROGRESS NOTES
CHIEF COMPLAINT:   Patient presents with:  Covid-19 Test      HPI:   Edda Smith is a 46year old female who presents for Covid 19 testing. Pt denies COVID exposure. At this time, not experiencing any COVID symptoms.  Had low grade temp in offic cervix and ovaries   • OTHER SURGICAL HISTORY  2009    colposcopy   • OTHER SURGICAL HISTORY  2015    abdominoplasty   • OTHER SURGICAL HISTORY Right 2/23/2016    bunion surgery   • OTHER SURGICAL HISTORY Bilateral 10/11/16    Breast reduction surgery -

## 2021-09-29 NOTE — TELEPHONE ENCOUNTER
Name from pharmacy: Hydrochlorothiazide 25 Mg Tab Unic          Will file in chart as: HYDROCHLOROTHIAZIDE 25 MG Oral Tab    Sig: TAKE ONE TABLET BY MOUTH ONE TIME DAILY     Original sig: TAKE ONE TABLET BY MOUTH ONE TIME DAILY    Disp:  90 tablet    Refil

## 2022-01-27 NOTE — TELEPHONE ENCOUNTER
Name from pharmacy: Azelastine Hydrochloride 137 Mcg/Spray Spr [address]          Will file in chart as: AZELASTINE  MCG/SPRAY Nasal Solution    Sig: INHALE 2 SPRAYS BY NASAL ROUTE TWO TIMES A DAY    Disp:  30 mL    Refills:  0    Start: 1/27/2022    Cla

## 2022-03-18 NOTE — PATIENT INSTRUCTIONS
Please do your lab work ASAP. Remember to fast for 10-12 hours but drink plenty of water.     Please call Alfredo Blue at [phone] to set up the following tests:  - mammogram  - MRI for Dr. Nadir Hand    Please see Dr. Elma Wing for f Troponin 253.39

## 2022-03-31 NOTE — TELEPHONE ENCOUNTER
Patient requested  Urgent referrl to see General Surgery Jazzy,Please review and sign plan and care if you agree Thank you. Watson Garnica V      [facility] REFERRALS.

## 2022-03-31 NOTE — TELEPHONE ENCOUNTER
From: Es Guerin  To: MASTER [place]  Sent: 3/31/2022 2:43 PM CDT  Subject: Question regarding [facility] EDWIN 2D+3D SCREENING BILAT (CPT=77067/78239)    Hello. I think that I may benefit from an MBI to be sure.

## 2022-04-06 NOTE — PATIENT INSTRUCTIONS
- Get blood tests done today  - Medications refilled  - Discuss further refills of diazepam, Adderall, escitalopram with [facility] [place]/Psychiatry clinic.  - Follow up with Dr. Lauren Upton for migraines. She is in this office on Thursdays, at main clinic rest of week:  [address] [facility] [facility], [address]  [phone]    - Follow up with Dr. Lottie Murphy and Reji Dao for urinary/pelvic symptoms:  [facility] [address] [facility]  [phone]    - Follow up with Dr. Lexus Becerra. Ask her opinion on molecular breast imaging.      - Schedule sleep study  [address]  [address], [address]  [phone]    - Schedule pulmonary function test (lung test). [phone].   - Follow up with me in 1 month. It was a pleasure seeing you in the clinic today. Thank you for choosing the [facility] office for your healthcare needs. Please call at [phone] with any questions or concerns.     Dottie Gonsalez MD

## 2022-04-06 NOTE — TELEPHONE ENCOUNTER
Chong Angelucci from [facility] called to clarify directions on  ketoconazole 2 % External Cream    She would like to know the area size in which the pt is applying the cream and how many times daily.     Ph: [phone]

## 2022-04-07 PROBLEM — E55.9 VITAMIN D DEFICIENCY: Status: ACTIVE | Noted: 2022-04-07

## 2022-04-08 NOTE — TELEPHONE ENCOUNTER
Looks like she has been using it under breast folds for intertrigo. Can use instructions of applying twice daily under breast folds as needed for rash.

## 2022-04-10 PROBLEM — N39.46 MIXED STRESS AND URGE URINARY INCONTINENCE: Chronic | Status: ACTIVE | Noted: 2021-05-06

## 2022-04-10 PROBLEM — M46.99 INFLAMMATORY SPONDYLOPATHY OF MULTIPLE SITES IN SPINE (HCC): Chronic | Status: ACTIVE | Noted: 2017-04-10

## 2022-04-10 PROBLEM — F98.8 ATTENTION DEFICIT DISORDER (ADD) WITHOUT HYPERACTIVITY: Chronic | Status: ACTIVE | Noted: 2020-09-21

## 2022-04-10 PROBLEM — Z91.09 POLLEN ALLERGIES: Status: ACTIVE | Noted: 2017-11-03

## 2022-04-10 PROBLEM — F33.0 MILD EPISODE OF RECURRENT MAJOR DEPRESSIVE DISORDER (HCC): Chronic | Status: ACTIVE | Noted: 2021-05-06

## 2022-04-10 PROBLEM — M46.99 INFLAMMATORY SPONDYLOPATHY OF MULTIPLE SITES IN SPINE: Chronic | Status: ACTIVE | Noted: 2017-04-10

## 2022-04-10 PROBLEM — F33.0 MILD EPISODE OF RECURRENT MAJOR DEPRESSIVE DISORDER: Chronic | Status: ACTIVE | Noted: 2021-05-06

## 2022-04-19 PROBLEM — F39 EPISODIC MOOD DISORDER (HCC): Status: ACTIVE | Noted: 2020-07-19

## 2022-04-19 PROBLEM — F39 EPISODIC MOOD DISORDER: Status: ACTIVE | Noted: 2020-07-19

## 2022-04-20 PROBLEM — F10.20 ALCOHOL USE DISORDER, MODERATE, DEPENDENCE (HCC): Status: ACTIVE | Noted: 2022-04-20

## 2022-04-20 NOTE — PROCEDURES
Findings:  FEV1 is 2.44L, 101% predicted. FVC is 2.88L, 96% predicted. FEV1/ FVC ratio is 0.85. The flow-volume loop demonstrates a normal pattern. The TLC is 4.82L, 94% predicted. The residual volume 1.89L, 104% predicted. The diffusion capacity is 86% predicted and 111% predicted when corrected for alveolar volume. Impression:  There is no airway obstruction on spirometry and visualized on flow-volume loop. Lung volumes are normal.  Diffusion capacity is normal.   There are no previous pulmonary function tests available for comparison. The above testing demonstrates normal pulmonary function. If there is clinical concern to exclude asthma or reactive airway syndrome, consider further assessment with bronchoprovocation challenge testing (such as mannitol challenge).

## 2022-04-25 NOTE — TELEPHONE ENCOUNTER
Patient requested Urgent Referral to see Plastic Surgeon Wesley,Please review and sign plan and care if you agree Thank you,Patient has Urgent Appointment April 26,2022. Darryn Toro V      [facility]G/[facility] REFERRALS.

## 2022-04-25 NOTE — TELEPHONE ENCOUNTER
[phone]  Spoke to pt, informed her that referral has been placed and signed by Dr. John Do. Informed pt that is is showing as \"pending review\". Pt stated she is very confused and does not know what to do. Called Dr. Jeremy Gaytan office at [phone] and spoke to [place]. Explained referral just placed today. Gloria stated she will call the pt and explain she cannot have the appointment until referral is approved by insurance.

## 2022-05-11 PROBLEM — N39.41 URGE INCONTINENCE: Status: ACTIVE | Noted: 2022-05-11

## 2022-05-11 NOTE — PROCEDURES
[facility] Pelvic Medicine  Bladder Scanner Note    Date:  2022    Patient Name:  Elías Chan  Patient :  2/10/1970   Patient MRN:  [id number]  Patient Age:  46year old      Diagnosis:  Post Void Residual    Procedure:  [employer] Bladder Scanner    Physician:  Dr. Shirin Arenas    RN:  Garrison Godinez RN     Bladder scanned per procedure with a residual amount of 3. Dr. Francois informed.  Voided 150cc in toilet prior to MD exam

## 2022-05-18 PROBLEM — F11.20 UNCOMPLICATED OPIOID DEPENDENCE (HCC): Status: RESOLVED | Noted: 2020-10-25 | Resolved: 2022-05-18

## 2022-05-18 NOTE — PATIENT INSTRUCTIONS
- Blood pressure looks good. We will continue the hydrochlorothiazide. - Follow up with Dr. Eliceo Mccann office for your neck and back pain  - Will refill tizanidine (muscle relaxant) at a higher dose  - Follow up with Dr. Lynne Candelaria, the sleep medicine doctor, for your mild sleep apnea. You can also discuss your cough with them. [facility] [address] , [address]  [phone]  - Get repeat blood tests done in the beginning of October. We will let you know when you need to see me again based on lab results. It was a pleasure seeing you in the clinic today. Thank you for choosing the [facility] office for your healthcare needs. Please call at [phone] with any questions or concerns.     Lazaro Bauer MD

## 2022-05-22 PROBLEM — E78.00 PURE HYPERCHOLESTEROLEMIA: Status: ACTIVE | Noted: 2022-05-22

## 2022-05-22 PROBLEM — G47.33 OBSTRUCTIVE SLEEP APNEA: Status: ACTIVE | Noted: 2022-05-22

## 2022-05-25 NOTE — TELEPHONE ENCOUNTER
medication switched to Lexepro. PA forms for Viibryd received and placed pod 1 blue folder. Azelaic Acid Counseling: Patient counseled that medicine may cause skin irritation and to avoid applying near the eyes.  In the event of skin irritation, the patient was advised to reduce the amount of the drug applied or use it less frequently.   The patient verbalized understanding of the proper use and possible adverse effects of azelaic acid.  All of the patient's questions and concerns were addressed.

## 2022-06-01 PROBLEM — N39.3 URINARY, INCONTINENCE, STRESS FEMALE: Status: ACTIVE | Noted: 2022-06-01

## 2022-06-01 PROBLEM — M62.89 PELVIC FLOOR TENSION: Status: ACTIVE | Noted: 2022-06-01

## 2022-06-01 PROBLEM — N39.41 URGE INCONTINENCE: Status: RESOLVED | Noted: 2022-05-11 | Resolved: 2022-06-01

## 2022-06-02 NOTE — PROGRESS NOTES
Discussed bowel regimen with patient.  Pre surgical folder given to patient to read and discuss with Dr Olga Etienne

## 2022-06-14 NOTE — TELEPHONE ENCOUNTER
Pt called and LM, has questions about meds she takes in preparation for upcoming surgery scheduled with Dr. Trenton Silva 6-23-22. Called pt [facility], reviewed pt can review pre-op packet materials, PAT info provided.   PEND for call back to answer questions for pt

## 2022-06-16 NOTE — TELEPHONE ENCOUNTER
TC to patient to ascertain what information she needs clarified prior to upcoming surgery. Pt states she is taking Naltrexone and has concerns about the med interfering with anesthesia and post op pain meds. Pt given PAT phone number and encouraged to talk to PCP Dr Ev Angulo.

## 2022-06-23 NOTE — ANESTHESIA POSTPROCEDURE EVALUATION
[facility] [facility] Patient Status:  Hospital Outpatient Surgery   Age/Gender 46year old female MRN [id number]   [facility] SURGERY Attending Dewayne Triana MD   Hosp Day # 0 PCP Luis James MD       Anesthesia Post-op Note    PLACEMENT OF MID-URETHRAL SLING, CYSTOSCOPY    Procedure Summary     Date: 06/23/22 Room / Location: [address] [place] [address] OR 16 / [address] [facility] OR    Anesthesia Start: 1330 Anesthesia Stop:     Procedure: PLACEMENT OF MID-URETHRAL SLING, CYSTOSCOPY (N/A Perineum) Diagnosis: (STRESS INCONTINENCE)    Surgeons: Dewayne Triana MD Anesthesiologist: Fawn Stark MD    Anesthesia Type: general ASA Status: 2          Anesthesia Type: general    Vitals Value Taken Time   /93 06/23/22 1425   Temp 97.2 06/23/22 1425   Pulse 127 06/23/22 1425   Resp 16 06/23/22 1425   SpO2 100 06/23/22 1425       Patient Location: PACU    Anesthesia Type: general    Airway Patency: patent    Postop Pain Control: adequate    Mental Status: preanesthetic baseline    Nausea/Vomiting: none    Cardiopulmonary/Hydration status: stable euvolemic    Complications: no apparent anesthesia related complications    Postop vital signs: stable    Dental Exam: Unchanged from Preop    Patient to be discharged from PACU when criteria met.

## 2022-06-23 NOTE — INTERVAL H&P NOTE
Pre-op Diagnosis: STRESS INCONTINENCE    The above referenced H&P was reviewed by Naeem Means MD on 6/23/2022, the patient was examined and no significant changes have occurred in the patient's condition since the H&P was performed. I discussed with the patient and/or legal representative the potential benefits, risks and side effects of this procedure; the likelihood of the patient achieving goals; and potential problems that might occur during recuperation. I discussed reasonable alternatives to the procedure, including risks, benefits and side effects related to the alternatives and risks related to not receiving this procedure. We will proceed with procedure as planned.

## 2022-06-23 NOTE — ANESTHESIA PROCEDURE NOTES
Airway  Date/Time: 6/23/2022 1:35 PM  Urgency: elective      General Information and Staff    Patient location during procedure: OR  Anesthesiologist: Lawyer Anderson MD  Performed: anesthesiologist     Indications and Patient Condition  Indications for airway management: anesthesia  Sedation level: deep  Preoxygenated: yes  Patient position: sniffing  Mask difficulty assessment: 1 - vent by mask    Final Airway Details  Final airway type: supraglottic airway      Successful airway: classic  Size 3      Number of attempts at approach: 1

## 2022-07-07 NOTE — TELEPHONE ENCOUNTER
Per Epic review, PA closed: Cannot find matching patient. Spoke with Akua Harden at [facility] who confirmed patient has [employer] for prescription manager and he will fax PA form to [phone].

## 2022-07-07 NOTE — TELEPHONE ENCOUNTER
Protocol Passed    Medication Requested:   hydroCHLOROthiazide 25 MG Oral Tab  Filled: 04/06/22 #90 w/ 1 refill    LOV: 05/18/22 w/ Dr. Shobha Rubio    RTC: 05/18/23   FOV: not on file   Recent Labs: 04/06/22

## 2022-07-07 NOTE — PROGRESS NOTES
Patient reports migraines since she was a teenager. Gets 2-3 migraines per month each lasting a few days. Mostly on right side of head and sometimes front and back, right eye twitches, vision gets blurred and some dizziness. Alternates between Tylenol and Motrin. Memory improved since she stopped drinking a few weeks ago. Is wondering if the brain nodule has grown?

## 2022-07-07 NOTE — TELEPHONE ENCOUNTER
No Protocol     Medication Requested:   tiZANidine 4 MG Oral Tab  Filled: 05/18/22 #30 w/ 1 refill   LOV: 05/18/22 w/ Dr. Fritz Public  RTC: 05/18/23  FOV: not on file   Recent Labs:  04/06/22

## 2022-07-08 NOTE — TELEPHONE ENCOUNTER
[facility] [employer] Acute Migraine PA Form for Rica Arroyo completed and faxed with fax confirmation received.

## 2022-07-11 NOTE — TELEPHONE ENCOUNTER
From: Madi Garland  Sent: 2022 6:06 PM CDT  To: Moe Roger Nurse  Subject: Migraine     By the way, since the  container is Trexemet that means I was mistaken about the Imitrex in that Imetrix most likely did work for me as I do not have any left over. I also used Midrin, Stadol and Maxalt in the past which also worked. However, I am allergic to Midrin. Please help.

## 2022-07-11 NOTE — TELEPHONE ENCOUNTER
Acute Migraine assessment:    Is this your typical migraine? Describe any change in character from past migraines. Yes:     Location of Pain (select all that apply):  forehead and right eye. # Days pain has been present:  7 days    Describe the pain:  Stabbing  Intensity of the headaches:   With medication:  Mild  Wiithout medication SEVERE  Associated Symptoms (check all that apply):   Sensitivity to light   Non-pharmaceutical interventions tried and outcome:   [] Lying down / sleeping:  BETTER  [] Being in a dark quiet room:  BETTER  [] Massage your head: not tried  [] Cold pack on your head/neck:  not tried  [] Hot pack on your head/neck:  not tried  [] Other:     Abortive Medications tried:  Tylenol with codeine  Current preventative medication list:  Topamax  Any missed doses? No  Any other relevant information:    Any medications contraindicated? Tried and failed? Midrin    Willing to try Medrol Dosepak? Yes  Last taken:    Willing to try Toradol/Decadron injections? Yes  Last taken:    Advised patient to seek immediate medical treatment in ED for any concerning symptoms or changes to condition.

## 2022-07-11 NOTE — TELEPHONE ENCOUNTER
Patient notified that Rx Medrol Dose Pack and Rx Imitrex were sent to [place].   Patient states she would like Dr Glenn Sorensen to also know that she has been experiencing numbness of both arms from her shoulders down to her finger tips today.

## 2022-07-11 NOTE — TELEPHONE ENCOUNTER
Pharmacy calling for clarification. Rn sent new RX for sumatriptan to reflect max dosage in one day. Pharmacist asking about Eliverto Pulse PA and informed this is pending and RN requested to fill Sumatriptan at this time.  JAMES.

## 2022-07-21 NOTE — PROGRESS NOTES
Patient having daily headaches but intensity has decreased. On Topamax during day and Tylenol with codeine at night. Visual disturbance improved and nausea improved.

## 2022-07-25 NOTE — TELEPHONE ENCOUNTER
Spoke with [employer] at [facility] and he states patient picked up the prescription Sumatriptan 100mg #9 on 7/11/2022.

## 2022-07-26 NOTE — TELEPHONE ENCOUNTER
Refused Medication: SUMATRIPTAN SUCCINATE 100 MG Oral Tab     Rx requested too early, last filled for 30 day supply on 7/11/22

## 2022-10-20 PROBLEM — Z91.49 HISTORY OF PSYCHOLOGICAL TRAUMA: Status: ACTIVE | Noted: 2022-10-20

## 2022-10-20 PROBLEM — F10.21 ALCOHOL USE DISORDER, MODERATE, IN EARLY REMISSION (HCC): Status: ACTIVE | Noted: 2022-10-20

## 2022-10-20 PROBLEM — F43.10 POST TRAUMATIC STRESS DISORDER (PTSD): Status: ACTIVE | Noted: 2022-10-20

## 2022-10-20 PROBLEM — Z91.419 HISTORY OF ADULT VICTIM OF ABUSE: Status: ACTIVE | Noted: 2022-10-20

## 2022-11-09 PROBLEM — F41.9 ANXIETY DISORDER: Status: ACTIVE | Noted: 2022-11-09

## 2022-11-09 PROBLEM — F42.9 OBSESSIVE-COMPULSIVE DISORDER: Status: ACTIVE | Noted: 2022-11-09

## 2022-11-09 PROBLEM — F43.10 PTSD (POST-TRAUMATIC STRESS DISORDER): Status: ACTIVE | Noted: 2022-11-09

## 2022-11-09 PROBLEM — F33.2 MDD (MAJOR DEPRESSIVE DISORDER), RECURRENT SEVERE, WITHOUT PSYCHOSIS (HCC): Status: ACTIVE | Noted: 2022-11-09

## 2023-01-06 PROBLEM — F90.2 ATTENTION DEFICIT HYPERACTIVITY DISORDER (ADHD), COMBINED TYPE: Status: ACTIVE | Noted: 2023-01-06

## 2023-04-24 ENCOUNTER — HOSPITAL ENCOUNTER (OUTPATIENT)
Dept: MAMMOGRAPHY | Facility: HOSPITAL | Age: 53
Discharge: HOME | End: 2023-04-24
Attending: SURGERY
Payer: COMMERCIAL

## 2023-04-24 DIAGNOSIS — Z12.31 SCREENING MAMMOGRAM, ENCOUNTER FOR: ICD-10-CM

## 2023-04-24 PROCEDURE — 77067 SCR MAMMO BI INCL CAD: CPT | Performed by: SURGERY

## 2023-04-24 PROCEDURE — 77063 BREAST TOMOSYNTHESIS BI: CPT | Performed by: SURGERY

## 2023-04-28 PROBLEM — F33.1 MODERATE EPISODE OF RECURRENT MAJOR DEPRESSIVE DISORDER (HCC): Status: ACTIVE | Noted: 2023-04-28

## 2023-05-04 ENCOUNTER — OFFICE VISIT (OUTPATIENT)
Dept: SURGERY | Facility: CLINIC | Age: 53
End: 2023-05-04
Payer: COMMERCIAL

## 2023-05-04 ENCOUNTER — TELEMEDICINE (OUTPATIENT)
Dept: INTERNAL MEDICINE CLINIC | Facility: CLINIC | Age: 53
End: 2023-05-04

## 2023-05-04 VITALS
BODY MASS INDEX: 21.44 KG/M2 | HEART RATE: 119 BPM | OXYGEN SATURATION: 96 % | RESPIRATION RATE: 16 BRPM | WEIGHT: 133.38 LBS | HEIGHT: 66 IN | DIASTOLIC BLOOD PRESSURE: 86 MMHG | SYSTOLIC BLOOD PRESSURE: 126 MMHG

## 2023-05-04 DIAGNOSIS — R44.8 SENSATION OF BOTH HEAT AND COLD: ICD-10-CM

## 2023-05-04 DIAGNOSIS — M79.672 LEFT FOOT PAIN: ICD-10-CM

## 2023-05-04 DIAGNOSIS — Z00.00 PREVENTATIVE HEALTH CARE: ICD-10-CM

## 2023-05-04 DIAGNOSIS — M46.99 INFLAMMATORY SPONDYLOPATHY OF MULTIPLE SITES IN SPINE (HCC): Chronic | ICD-10-CM

## 2023-05-04 DIAGNOSIS — I73.00 RAYNAUD'S DISEASE WITHOUT GANGRENE: ICD-10-CM

## 2023-05-04 DIAGNOSIS — M54.9 CHRONIC NECK AND BACK PAIN: Primary | Chronic | ICD-10-CM

## 2023-05-04 DIAGNOSIS — J30.9 CHRONIC ALLERGIC RHINITIS: ICD-10-CM

## 2023-05-04 DIAGNOSIS — M54.2 CHRONIC NECK AND BACK PAIN: Primary | Chronic | ICD-10-CM

## 2023-05-04 DIAGNOSIS — Z12.31 SCREENING MAMMOGRAM FOR BREAST CANCER: ICD-10-CM

## 2023-05-04 DIAGNOSIS — G89.29 CHRONIC NECK AND BACK PAIN: Primary | Chronic | ICD-10-CM

## 2023-05-04 DIAGNOSIS — R92.2 DENSE BREAST TISSUE: Primary | ICD-10-CM

## 2023-05-04 PROCEDURE — 3008F BODY MASS INDEX DOCD: CPT

## 2023-05-04 PROCEDURE — 3074F SYST BP LT 130 MM HG: CPT

## 2023-05-04 PROCEDURE — 99214 OFFICE O/P EST MOD 30 MIN: CPT

## 2023-05-04 PROCEDURE — 99214 OFFICE O/P EST MOD 30 MIN: CPT | Performed by: INTERNAL MEDICINE

## 2023-05-04 PROCEDURE — 3079F DIAST BP 80-89 MM HG: CPT

## 2023-05-04 RX ORDER — MONTELUKAST SODIUM 10 MG/1
10 TABLET ORAL NIGHTLY
Qty: 30 TABLET | Refills: 0 | Status: SHIPPED | OUTPATIENT
Start: 2023-05-04

## 2023-05-04 NOTE — PROGRESS NOTES
Roberth Erickson is a 48year old female here today for a telemedicine/video visit. Patient is in the state of [place] during this visit. Roberth Erickson verbally consents to a Video visit service on 05/04/23. Patient understands and accepts financial responsibility for any deductible, co-insurance and/or co-pays associated with this service. Duration of the service: 20 minutes  Start time: 4:30 PM  End time: 4:50 PM    HPI:  Patient presents for follow up on chronic medical issues. Chronic joint pain - worsening pain of late. Especially in neck and back (all levels of spine). Right hip, both legs cramp when in car. Raynaud's is getting worse. Washing hands can trigger it. Post-nasal drip is worsening. Left foot has been more painful of late. No specific injury to the foot. Has noted some more cold sensitivity of late/feels cold all the time. Past medical, surgical, family, and social histories were reviewed and are unchanged from previous visit. ROS:  GENERAL: denies fevers/chills/sweats  SKIN: denies any unusual skin lesions  EYES: denies blurred vision or double vision  HEENT: nasal congestion/post-nasal drip  LUNGS: denies shortness of breath with exertion  CARDIOVASCULAR: denies chest pain on exertion  GI: denies abdominal pain, denies heartburn, denies diarrhea  MUSCULOSKELETAL: pain in multiple joints  NEURO: denies headaches    EXAM:  GENERAL: Alert and oriented, well developed, well nourished,in no apparent distress  SKIN: no rashes,no suspicious lesions of face  HEENT: atraumatic, EOMI, normal lid and conjunctiva  NECK: no grossly visible jvd or thyromegaly  LUNGS: speaking in full sentences, no increased work of breathing, no audible wheezing  NEURO: alert and oriented x 3  PSYCH: pleasant, appropriate mood and affect    ASSESSMENT/PLAN:  1. Chronic neck and back pain  2.  Inflammatory spondylopathy of multiple sites in spine ([facility] [address] - cervical, thoracic, and lumbar  Chronic pain; worse of late. Will check labs as below. May consider referral to Rheumatology or Physiatry based on lab results.  - RHEUMATOID ARTHRITIS FACTOR; Future  - CONNECTIVE TISSUE DISEASE ([place]) SCREEN; Future    3. Chronic allergic rhinitis  Worsening nasal drip. Will try montelukast.  Already on prescription nasal sprays as well. - montelukast 10 MG Oral Tab; Take 1 tablet (10 mg total) by mouth nightly. Dispense: 30 tablet; Refill: 0    4. Left foot pain  Worsening foot pain of late. No specific injury to the foot. Will have patient follow up with Podiatry. - PODIATRY - INTERNAL    5. Raynaud's disease without gangrene  On amlodipine, but worsening symptoms of late. Will check [place]. May consider switching to a different calcium channel blocker. - CONNECTIVE TISSUE DISEASE ([place]) SCREEN; Future    6. Sensation of both heat and cold  Increased cold sensitivity/feels cold all the time. Will check labs as below. - VITAMIN D, 25-HYDROXY; Future  - IRON AND TIBC; Future  - VITAMIN B12; Future  - FSH; Future  - LH (LUTEINIZING HORMONE); Future  - ESTRADIOL; Future  - PROGESTERONE; Future    7. Preventative health care  Labs ordered as below. - CBC WITH DIFFERENTIAL WITH PLATELET; Future  - COMP METABOLIC PANEL (14); Future  - HEMOGLOBIN A1C; Future  - LIPID PANEL; Future  - TSH W REFLEX TO FREE T4; Future    Return to clinic in 6-12 months for follow up on chronic issues, or earlier as needed for acute issues. Please note that the following visit was completed using two-way, real-time interactive audio and video communication. This has been done in good ti to provide continuity of care in the best interest of the provider-patient relationship. There are limitations of this visit as a complete head to toe physical exam could not be performed. Every conscious effort was taken to allow for sufficient and adequate time.   This billing was spent on reviewing labs, medications, radiology tests and decision making. Appropriate medical decision-making and tests are ordered as detailed in the plan of care above.

## 2023-05-04 NOTE — PATIENT INSTRUCTIONS
- Get blood tests done when fasting (water and medications only for 8 hours)  - Follow up with Dr. Elliot Cordova, foot specialist  [address] [place] [address]  [id number] [address] [place] [address]  [phone]    - Start prescription nasal congestion/allergy medication, montelukast.  Take 1 tablet nightly. It was a pleasure seeing you in the clinic today. Thank you for choosing the [facility] office for your healthcare needs. Please call at [phone] with any questions or concerns.     Lei Fowler MD

## 2023-05-09 ENCOUNTER — HOSPITAL ENCOUNTER (EMERGENCY)
Age: 53
Discharge: HOME | End: 2023-05-09
Attending: STUDENT IN AN ORGANIZED HEALTH CARE EDUCATION/TRAINING PROGRAM
Payer: COMMERCIAL

## 2023-05-09 ENCOUNTER — LAB ENCOUNTER (OUTPATIENT)
Dept: LAB | Age: 53
End: 2023-05-09
Attending: INTERNAL MEDICINE
Payer: COMMERCIAL

## 2023-05-09 ENCOUNTER — TELEPHONE (OUTPATIENT)
Dept: INTERNAL MEDICINE CLINIC | Facility: CLINIC | Age: 53
End: 2023-05-09

## 2023-05-09 VITALS
BODY MASS INDEX: 20.89 KG/M2 | SYSTOLIC BLOOD PRESSURE: 121 MMHG | OXYGEN SATURATION: 98 % | WEIGHT: 130 LBS | DIASTOLIC BLOOD PRESSURE: 91 MMHG | HEART RATE: 103 BPM | RESPIRATION RATE: 18 BRPM | HEIGHT: 66 IN | TEMPERATURE: 99 F

## 2023-05-09 DIAGNOSIS — R44.8 SENSATION OF BOTH HEAT AND COLD: ICD-10-CM

## 2023-05-09 DIAGNOSIS — M46.99 INFLAMMATORY SPONDYLOPATHY OF MULTIPLE SITES IN SPINE (HCC): Chronic | ICD-10-CM

## 2023-05-09 DIAGNOSIS — E87.6 HYPOKALEMIA: ICD-10-CM

## 2023-05-09 DIAGNOSIS — G43.809 OTHER MIGRAINE WITHOUT STATUS MIGRAINOSUS, NOT INTRACTABLE: Primary | ICD-10-CM

## 2023-05-09 DIAGNOSIS — M54.2 CHRONIC NECK AND BACK PAIN: Chronic | ICD-10-CM

## 2023-05-09 DIAGNOSIS — Z00.00 PREVENTATIVE HEALTH CARE: ICD-10-CM

## 2023-05-09 DIAGNOSIS — G89.29 CHRONIC NECK AND BACK PAIN: Chronic | ICD-10-CM

## 2023-05-09 DIAGNOSIS — I73.00 RAYNAUD'S DISEASE WITHOUT GANGRENE: ICD-10-CM

## 2023-05-09 DIAGNOSIS — M54.9 CHRONIC NECK AND BACK PAIN: Chronic | ICD-10-CM

## 2023-05-09 LAB
ALBUMIN SERPL-MCNC: 4.5 G/DL (ref 3.4–5)
ALBUMIN/GLOB SERPL: 1 {RATIO} (ref 1–2)
ALP LIVER SERPL-CCNC: 92 U/L
ALT SERPL-CCNC: 24 U/L
ANION GAP SERPL CALC-SCNC: 3 MMOL/L (ref 0–18)
ANION GAP SERPL CALC-SCNC: 5 MMOL/L (ref 0–18)
AST SERPL-CCNC: 28 U/L (ref 15–37)
BASOPHILS # BLD AUTO: 0.04 X10(3) UL (ref 0–0.2)
BASOPHILS NFR BLD AUTO: 0.6 %
BILIRUB SERPL-MCNC: 0.5 MG/DL (ref 0.1–2)
BUN BLD-MCNC: 9 MG/DL (ref 7–18)
BUN BLD-MCNC: 9 MG/DL (ref 7–18)
CALCIUM BLD-MCNC: 10.7 MG/DL (ref 8.5–10.1)
CALCIUM BLD-MCNC: 9.9 MG/DL (ref 8.5–10.1)
CHLORIDE SERPL-SCNC: 101 MMOL/L (ref 98–112)
CHLORIDE SERPL-SCNC: 105 MMOL/L (ref 98–112)
CHOLEST SERPL-MCNC: 217 MG/DL (ref ?–200)
CO2 SERPL-SCNC: 30 MMOL/L (ref 21–32)
CO2 SERPL-SCNC: 30 MMOL/L (ref 21–32)
CREAT BLD-MCNC: 1.02 MG/DL
CREAT BLD-MCNC: 1.13 MG/DL
EOSINOPHIL # BLD AUTO: 0.07 X10(3) UL (ref 0–0.7)
EOSINOPHIL NFR BLD AUTO: 1 %
ERYTHROCYTE [DISTWIDTH] IN BLOOD BY AUTOMATED COUNT: 13 %
EST. AVERAGE GLUCOSE BLD GHB EST-MCNC: 128 MG/DL (ref 68–126)
ESTRADIOL SERPL-MCNC: 32.1 PG/ML
FASTING PATIENT LIPID ANSWER: YES
FASTING STATUS PATIENT QL REPORTED: YES
FSH SERPL-ACNC: 165.8 MIU/ML
GFR SERPLBLD BASED ON 1.73 SQ M-ARVRAT: 58 ML/MIN/1.73M2 (ref 60–?)
GFR SERPLBLD BASED ON 1.73 SQ M-ARVRAT: 66 ML/MIN/1.73M2 (ref 60–?)
GLOBULIN PLAS-MCNC: 4.3 G/DL (ref 2.8–4.4)
GLUCOSE BLD-MCNC: 121 MG/DL (ref 70–99)
GLUCOSE BLD-MCNC: 99 MG/DL (ref 70–99)
HBA1C MFR BLD: 6.1 % (ref ?–5.7)
HCT VFR BLD AUTO: 45.4 %
HDLC SERPL-MCNC: 90 MG/DL (ref 40–59)
HGB BLD-MCNC: 14.6 G/DL
IMM GRANULOCYTES # BLD AUTO: 0.01 X10(3) UL (ref 0–1)
IMM GRANULOCYTES NFR BLD: 0.1 %
IRON SATN MFR SERPL: 30 %
IRON SERPL-MCNC: 119 UG/DL
LDLC SERPL CALC-MCNC: 113 MG/DL (ref ?–100)
LH SERPL-ACNC: 63.3 MIU/ML
LYMPHOCYTES # BLD AUTO: 2.74 X10(3) UL (ref 1–4)
LYMPHOCYTES NFR BLD AUTO: 38.6 %
MCH RBC QN AUTO: 27.4 PG (ref 26–34)
MCHC RBC AUTO-ENTMCNC: 32.2 G/DL (ref 31–37)
MCV RBC AUTO: 85.2 FL
MONOCYTES # BLD AUTO: 0.56 X10(3) UL (ref 0.1–1)
MONOCYTES NFR BLD AUTO: 7.9 %
NEUTROPHILS # BLD AUTO: 3.68 X10 (3) UL (ref 1.5–7.7)
NEUTROPHILS # BLD AUTO: 3.68 X10(3) UL (ref 1.5–7.7)
NEUTROPHILS NFR BLD AUTO: 51.8 %
NONHDLC SERPL-MCNC: 127 MG/DL (ref ?–130)
OSMOLALITY SERPL CALC.SUM OF ELEC: 278 MOSM/KG (ref 275–295)
OSMOLALITY SERPL CALC.SUM OF ELEC: 289 MOSM/KG (ref 275–295)
PLATELET # BLD AUTO: 323 10(3)UL (ref 150–450)
POTASSIUM SERPL-SCNC: 2.9 MMOL/L (ref 3.5–5.1)
POTASSIUM SERPL-SCNC: 3.1 MMOL/L (ref 3.5–5.1)
PROGEST SERPL-MCNC: 0.86 NG/ML
PROT SERPL-MCNC: 8.8 G/DL (ref 6.4–8.2)
RBC # BLD AUTO: 5.33 X10(6)UL
RHEUMATOID FACT SERPL-ACNC: <10 IU/ML (ref ?–15)
SODIUM SERPL-SCNC: 134 MMOL/L (ref 136–145)
SODIUM SERPL-SCNC: 140 MMOL/L (ref 136–145)
TIBC SERPL-MCNC: 396 UG/DL (ref 240–450)
TRANSFERRIN SERPL-MCNC: 266 MG/DL (ref 200–360)
TRIGL SERPL-MCNC: 80 MG/DL (ref 30–149)
TSI SER-ACNC: 0.49 MIU/ML (ref 0.36–3.74)
VIT B12 SERPL-MCNC: 431 PG/ML (ref 193–986)
VIT D+METAB SERPL-MCNC: 53.5 NG/ML (ref 30–100)
VLDLC SERPL CALC-MCNC: 14 MG/DL (ref 0–30)
WBC # BLD AUTO: 7.1 X10(3) UL (ref 4–11)

## 2023-05-09 PROCEDURE — 36415 COLL VENOUS BLD VENIPUNCTURE: CPT

## 2023-05-09 PROCEDURE — 86225 DNA ANTIBODY NATIVE: CPT

## 2023-05-09 PROCEDURE — 82607 VITAMIN B-12: CPT

## 2023-05-09 PROCEDURE — 80061 LIPID PANEL: CPT

## 2023-05-09 PROCEDURE — 83001 ASSAY OF GONADOTROPIN (FSH): CPT

## 2023-05-09 PROCEDURE — 83036 HEMOGLOBIN GLYCOSYLATED A1C: CPT

## 2023-05-09 PROCEDURE — 84443 ASSAY THYROID STIM HORMONE: CPT

## 2023-05-09 PROCEDURE — 82670 ASSAY OF TOTAL ESTRADIOL: CPT

## 2023-05-09 PROCEDURE — 84144 ASSAY OF PROGESTERONE: CPT

## 2023-05-09 PROCEDURE — 83540 ASSAY OF IRON: CPT

## 2023-05-09 PROCEDURE — 80053 COMPREHEN METABOLIC PANEL: CPT

## 2023-05-09 PROCEDURE — 99284 EMERGENCY DEPT VISIT MOD MDM: CPT

## 2023-05-09 PROCEDURE — 82306 VITAMIN D 25 HYDROXY: CPT

## 2023-05-09 PROCEDURE — 85025 COMPLETE CBC W/AUTO DIFF WBC: CPT

## 2023-05-09 PROCEDURE — 86431 RHEUMATOID FACTOR QUANT: CPT

## 2023-05-09 PROCEDURE — 96361 HYDRATE IV INFUSION ADD-ON: CPT

## 2023-05-09 PROCEDURE — 86038 ANTINUCLEAR ANTIBODIES: CPT

## 2023-05-09 PROCEDURE — 83550 IRON BINDING TEST: CPT

## 2023-05-09 PROCEDURE — 96374 THER/PROPH/DIAG INJ IV PUSH: CPT

## 2023-05-09 PROCEDURE — 83002 ASSAY OF GONADOTROPIN (LH): CPT

## 2023-05-09 RX ORDER — SUMATRIPTAN 50 MG/1
50 TABLET, FILM COATED ORAL EVERY 2 HOUR PRN
Qty: 6 TABLET | Refills: 0 | Status: SHIPPED | OUTPATIENT
Start: 2023-05-09 | End: 2023-05-10

## 2023-05-09 RX ORDER — METOCLOPRAMIDE HYDROCHLORIDE 5 MG/ML
10 INJECTION INTRAMUSCULAR; INTRAVENOUS ONCE
Status: DISCONTINUED | OUTPATIENT
Start: 2023-05-09 | End: 2023-05-09

## 2023-05-09 RX ORDER — SUMATRIPTAN 6 MG/.5ML
6 INJECTION, SOLUTION SUBCUTANEOUS ONCE
Status: COMPLETED | OUTPATIENT
Start: 2023-05-09 | End: 2023-05-09

## 2023-05-09 RX ORDER — KETOROLAC TROMETHAMINE 15 MG/ML
15 INJECTION, SOLUTION INTRAMUSCULAR; INTRAVENOUS ONCE
Status: DISCONTINUED | OUTPATIENT
Start: 2023-05-09 | End: 2023-05-09

## 2023-05-09 RX ORDER — KETOROLAC TROMETHAMINE 30 MG/ML
INJECTION, SOLUTION INTRAMUSCULAR; INTRAVENOUS
Status: COMPLETED
Start: 2023-05-09 | End: 2023-05-09

## 2023-05-09 RX ORDER — POTASSIUM CHLORIDE 20 MEQ/1
40 TABLET, EXTENDED RELEASE ORAL ONCE
Status: COMPLETED | OUTPATIENT
Start: 2023-05-09 | End: 2023-05-09

## 2023-05-09 RX ORDER — POTASSIUM CHLORIDE 20 MEQ/1
20 TABLET, EXTENDED RELEASE ORAL 2 TIMES DAILY
Qty: 4 TABLET | Refills: 0 | Status: SHIPPED | OUTPATIENT
Start: 2023-05-09 | End: 2023-05-11

## 2023-05-09 RX ORDER — DIPHENHYDRAMINE HYDROCHLORIDE 50 MG/ML
25 INJECTION INTRAMUSCULAR; INTRAVENOUS ONCE
Status: DISCONTINUED | OUTPATIENT
Start: 2023-05-09 | End: 2023-05-09

## 2023-05-09 NOTE — TELEPHONE ENCOUNTER
Received a page from lab for critical lab result- potassium 2.9. Spoke to patient, she reports having loose stool and not being able to eat/hydrate well.    Recommended ER/urgent care for IVFs/potassium replenishment

## 2023-05-10 ENCOUNTER — TELEPHONE (OUTPATIENT)
Dept: INTERNAL MEDICINE CLINIC | Facility: CLINIC | Age: 53
End: 2023-05-10

## 2023-05-10 ENCOUNTER — PATIENT MESSAGE (OUTPATIENT)
Dept: INTERNAL MEDICINE CLINIC | Facility: CLINIC | Age: 53
End: 2023-05-10

## 2023-05-10 DIAGNOSIS — R19.8 GI SYMPTOMS: ICD-10-CM

## 2023-05-10 DIAGNOSIS — E87.6 HYPOKALEMIA: Primary | ICD-10-CM

## 2023-05-10 DIAGNOSIS — R19.7 DIARRHEA, UNSPECIFIED TYPE: Primary | ICD-10-CM

## 2023-05-10 LAB
DSDNA IGG SERPL IA-ACNC: <0.6 IU/ML
ENA AB SER QL IA: 0.3 UG/L
ENA AB SER QL IA: NEGATIVE

## 2023-05-10 NOTE — TELEPHONE ENCOUNTER
If her diet is already very good, I would recommend starting a nightly cholesterol medication to lower her cholesterol levels and help decrease future risk of heart disease. I can send in a prescription for atorvastatin if she is willing to start this. For the potassium, I would recommend repeating a metabolic panel in 3-4 weeks. Does not have to fast.  According to the emergency department notes she is having a lot of diarrhea/GI issues - this can lead to low potassium, so as I mentioned in my Mychart result message would recommend follow up with GI when she is able to.

## 2023-05-10 NOTE — TELEPHONE ENCOUNTER
Call placed to patient. Shared with patient RP's recommendations/comments as listed in note below. Patient verbalized understanding, and wanted to share following with RP:     -she is agreeable to starting  Atorvastatin, can send to [facility] on file  -patient did schedule appointment with GI (7/19), but does not want to proceed with appointment until further discussing her GI symptoms with RP. Patient verbalized displeasure with \"ER doctor documenting she had chronic diarrhea for years\", patient states \"that is not true- I said I have softer stool depending on what I eat, so if GI consult is based on this information it is in error and I want to speak with RP about this to see if GI consult is still needed\". Patient requesting VV to discuss, versus OV \"because of cost\". RP please advise if you are okay with VV (LOV 5/18/22) TY!

## 2023-05-10 NOTE — TELEPHONE ENCOUNTER
Pt was seen at ER for migraines. Pt requesting VV for HFU. Ok to offer VV? Pt also requesting call back from nurse as she would like to discuss ER visit. Please advise.

## 2023-05-10 NOTE — TELEPHONE ENCOUNTER
Spoke with patient-upset that DV sent her to ED yesterday. Stated potassium was re-checked in ER and result was 3.1 so she did not receive an IV infusion. Per pt \"I was sent home with some pills I could have gotten at [employer]. \" Advised pt per our lab/office protocol DV was advised of her critical lab value and provided her guidance based on that. Pt stating she now has $1000 hospital bill because of this ER visit. Pt would like to f/u with RP but does not want to come in for OV due to cost wants a VV to further discuss lab results from yesterday and any f/u labs. While on the line I reviewed RP recs/test results he sent via [facility]. Discussed dietary choices r/t cholesterol & blood sugars. Pt repeatedly stating \"I don't even eat any of that. \" Advised pt there could be a hereditary component as well which is why RP wants her to monitor her diet and f/u as needed. Pt states her diet is not affecting her results and wants to know what the next steps are. Recommended she be seen in office for a follow up discussion with RP as last visit was a VV. Pt repeatedly speaking over me-declined OV and wants to know what RP wants to do. Please advise-TY!

## 2023-05-10 NOTE — ED QUICK NOTES
Pt still complaining of headache, appears anxious. md and rn at bedside to talk to pt.  Pt states she is working herself up and would like to go home to her own space

## 2023-05-10 NOTE — DISCHARGE INSTRUCTIONS
Increase your potassium in diet for the next couple days. Follow-up with your prime MD for GI consultation is because of the chronic diarrhea. Follow-up with your neurologist.  For your migraines. Return if any severe headache, nausea, vomiting, fevers or chills. You were seen in the emergency room in a limited time. There is a possibility that although we do not see any acute process at this present time that things can change with time. Is therefore imperative that you follow-up with primary care physician for close follow-up. If there is any significant progression of your pain  or other symptoms you to return immediately to the emergency room.

## 2023-05-10 NOTE — TELEPHONE ENCOUNTER
Patient called requesting a referral GI Speacialist Dr. Kirill Rodrigues. .. Patient is asking that it be sent asap. She can be reached at: [phone]    Fax referral to [facility]:  [phone]    Thanks.

## 2023-05-11 NOTE — TELEPHONE ENCOUNTER
Atorvastatin sent to pharmacy. I see she was able to get an appointment with GI/Dr. Sandhya Bennett for two months from now - maybe a good idea to keep that appointment so that she can discuss the softer stool issues/any other bowel or stomach issues. Ok to wait until then/does not have to see GI urgently since she is not actually having diarrhea regularly. Yes, ok to schedule video or telephone visit if she has further questions/wants to discuss something further.

## 2023-05-12 ENCOUNTER — PATIENT OUTREACH (OUTPATIENT)
Dept: CASE MANAGEMENT | Age: 53
End: 2023-05-12

## 2023-05-12 PROBLEM — F10.21 ALCOHOL DEPENDENCE IN SUSTAINED FULL REMISSION (HCC): Status: ACTIVE | Noted: 2023-05-12

## 2023-05-12 PROBLEM — F33.2 MAJOR DEPRESSIVE DISORDER, RECURRENT SEVERE WITHOUT PSYCHOTIC FEATURES (HCC): Status: ACTIVE | Noted: 2023-05-12

## 2023-05-12 PROBLEM — F12.11: Status: ACTIVE | Noted: 2023-05-12

## 2023-05-15 ENCOUNTER — TELEPHONE (OUTPATIENT)
Dept: INTERNAL MEDICINE CLINIC | Facility: CLINIC | Age: 53
End: 2023-05-15

## 2023-05-15 DIAGNOSIS — M54.9 CHRONIC NECK AND BACK PAIN: Primary | ICD-10-CM

## 2023-05-15 DIAGNOSIS — M79.10 MUSCLE ACHE: ICD-10-CM

## 2023-05-15 DIAGNOSIS — M46.99 INFLAMMATORY SPONDYLOPATHY OF MULTIPLE SITES IN SPINE (HCC): ICD-10-CM

## 2023-05-15 DIAGNOSIS — G89.29 CHRONIC NECK AND BACK PAIN: Primary | ICD-10-CM

## 2023-05-15 DIAGNOSIS — M54.2 CHRONIC NECK AND BACK PAIN: Primary | ICD-10-CM

## 2023-05-15 NOTE — TELEPHONE ENCOUNTER
Pain management referral entered for Dr. Cecy Diaz [facility]. [address] [facility], [address]  [phone]

## 2023-05-15 NOTE — TELEPHONE ENCOUNTER
Pt has a hx of migraines, muscle aches and cramping, myalgias. Please advise. Also has VV for 5/25/23.

## 2023-05-16 NOTE — PROGRESS NOTES
3rd attempt ED hfu apt request  No answer, generic vm  PCP -Existing apt (5/25)  Unable to contact pt after multiple attempts  Closing encounter

## 2023-05-24 ENCOUNTER — TELEPHONE (OUTPATIENT)
Dept: ORTHOPEDICS CLINIC | Facility: CLINIC | Age: 53
End: 2023-05-24

## 2023-05-24 ENCOUNTER — LAB ENCOUNTER (OUTPATIENT)
Dept: LAB | Age: 53
End: 2023-05-24
Attending: INTERNAL MEDICINE
Payer: COMMERCIAL

## 2023-05-24 DIAGNOSIS — M79.672 LEFT FOOT PAIN: Primary | ICD-10-CM

## 2023-05-24 DIAGNOSIS — E87.6 HYPOKALEMIA: ICD-10-CM

## 2023-05-24 LAB
ANION GAP SERPL CALC-SCNC: 2 MMOL/L (ref 0–18)
BUN BLD-MCNC: 13 MG/DL (ref 7–18)
CALCIUM BLD-MCNC: 9.6 MG/DL (ref 8.5–10.1)
CHLORIDE SERPL-SCNC: 102 MMOL/L (ref 98–112)
CO2 SERPL-SCNC: 30 MMOL/L (ref 21–32)
CREAT BLD-MCNC: 0.95 MG/DL
FASTING STATUS PATIENT QL REPORTED: NO
GFR SERPLBLD BASED ON 1.73 SQ M-ARVRAT: 72 ML/MIN/1.73M2 (ref 60–?)
GLUCOSE BLD-MCNC: 97 MG/DL (ref 70–99)
OSMOLALITY SERPL CALC.SUM OF ELEC: 278 MOSM/KG (ref 275–295)
POTASSIUM SERPL-SCNC: 3.4 MMOL/L (ref 3.5–5.1)
SODIUM SERPL-SCNC: 134 MMOL/L (ref 136–145)

## 2023-05-24 PROCEDURE — 36415 COLL VENOUS BLD VENIPUNCTURE: CPT

## 2023-05-24 PROCEDURE — 80048 BASIC METABOLIC PNL TOTAL CA: CPT

## 2023-05-24 NOTE — TELEPHONE ENCOUNTER
Reviewed patients chart, xray orders are required. Order placed for left foot xrays.    Please contact patient advise to arrive 15 mins prior to patients appt to complete x-ray order and schedule patients xray appt-Thank you

## 2023-05-25 ENCOUNTER — OFFICE VISIT (OUTPATIENT)
Dept: INTERNAL MEDICINE CLINIC | Facility: CLINIC | Age: 53
End: 2023-05-25
Payer: COMMERCIAL

## 2023-05-25 VITALS
HEIGHT: 66 IN | SYSTOLIC BLOOD PRESSURE: 130 MMHG | RESPIRATION RATE: 16 BRPM | DIASTOLIC BLOOD PRESSURE: 80 MMHG | OXYGEN SATURATION: 89 % | WEIGHT: 135.19 LBS | HEART RATE: 102 BPM | TEMPERATURE: 97 F | BODY MASS INDEX: 21.73 KG/M2

## 2023-05-25 DIAGNOSIS — M54.2 CHRONIC NECK AND BACK PAIN: Chronic | ICD-10-CM

## 2023-05-25 DIAGNOSIS — I10 PRIMARY HYPERTENSION: Chronic | ICD-10-CM

## 2023-05-25 DIAGNOSIS — I73.00 RAYNAUD'S DISEASE WITHOUT GANGRENE: Primary | ICD-10-CM

## 2023-05-25 DIAGNOSIS — M54.9 CHRONIC NECK AND BACK PAIN: Chronic | ICD-10-CM

## 2023-05-25 DIAGNOSIS — G89.29 CHRONIC NECK AND BACK PAIN: Chronic | ICD-10-CM

## 2023-05-25 DIAGNOSIS — E87.6 HYPOKALEMIA: ICD-10-CM

## 2023-05-25 DIAGNOSIS — Z23 NEED FOR SHINGLES VACCINE: ICD-10-CM

## 2023-05-25 RX ORDER — NIFEDIPINE 30 MG/1
30 TABLET, FILM COATED, EXTENDED RELEASE ORAL DAILY
Qty: 90 TABLET | Refills: 1 | Status: SHIPPED | OUTPATIENT
Start: 2023-05-25

## 2023-05-25 NOTE — PATIENT INSTRUCTIONS
- Continue new cholesterol medication, atorvastatin  - We will try a different medication, nifedipine, for the Raynaud's. Take the nifedipine instead of amlodipine. Take 1 tablet once daily. - Continue other medications  - Follow up with Pain Management clinic, podiatrist, GI specialist as scheduled  - First shingles shot given today. You will need another shingles shot in 2-6 months. You can call our office to schedule a nurse visit for this shot. - Follow up next spring for chronic issues; follow up earlier as needed. It was a pleasure seeing you in the clinic today. Thank you for choosing the [facility] office for your healthcare needs. Please call at [phone] with any questions or concerns.     Enmanuel Thomason MD

## 2023-06-13 ENCOUNTER — HOSPITAL ENCOUNTER (OUTPATIENT)
Dept: GENERAL RADIOLOGY | Age: 53
Discharge: HOME | End: 2023-06-13
Attending: PODIATRIST
Payer: COMMERCIAL

## 2023-06-13 DIAGNOSIS — M54.2 CHRONIC NECK AND BACK PAIN: ICD-10-CM

## 2023-06-13 DIAGNOSIS — M79.672 LEFT FOOT PAIN: ICD-10-CM

## 2023-06-13 DIAGNOSIS — G89.29 CHRONIC NECK AND BACK PAIN: ICD-10-CM

## 2023-06-13 DIAGNOSIS — M54.9 CHRONIC NECK AND BACK PAIN: ICD-10-CM

## 2023-06-13 PROCEDURE — 73630 X-RAY EXAM OF FOOT: CPT | Performed by: PODIATRIST

## 2023-06-13 RX ORDER — TIZANIDINE 4 MG/1
TABLET ORAL
Qty: 30 TABLET | Refills: 2 | Status: SHIPPED | OUTPATIENT
Start: 2023-06-13

## 2023-06-16 ENCOUNTER — OFFICE VISIT (OUTPATIENT)
Dept: PAIN CLINIC | Facility: CLINIC | Age: 53
End: 2023-06-16
Payer: COMMERCIAL

## 2023-06-16 VITALS
TEMPERATURE: 90 F | OXYGEN SATURATION: 98 % | DIASTOLIC BLOOD PRESSURE: 70 MMHG | SYSTOLIC BLOOD PRESSURE: 110 MMHG | WEIGHT: 135 LBS | BODY MASS INDEX: 22 KG/M2

## 2023-06-16 DIAGNOSIS — M47.812 CERVICAL SPONDYLOSIS: Primary | ICD-10-CM

## 2023-06-16 DIAGNOSIS — M47.816 LUMBAR SPONDYLOSIS: ICD-10-CM

## 2023-06-16 DIAGNOSIS — M54.16 LUMBAR RADICULOPATHY: ICD-10-CM

## 2023-06-16 PROCEDURE — 3078F DIAST BP <80 MM HG: CPT | Performed by: PHYSICIAN ASSISTANT

## 2023-06-16 PROCEDURE — 99204 OFFICE O/P NEW MOD 45 MIN: CPT | Performed by: PHYSICIAN ASSISTANT

## 2023-06-16 PROCEDURE — 3074F SYST BP LT 130 MM HG: CPT | Performed by: PHYSICIAN ASSISTANT

## 2023-06-19 ENCOUNTER — HOSPITAL ENCOUNTER (OUTPATIENT)
Dept: GENERAL RADIOLOGY | Age: 53
Discharge: HOME | End: 2023-06-19
Attending: PHYSICIAN ASSISTANT
Payer: COMMERCIAL

## 2023-06-19 DIAGNOSIS — M47.812 CERVICAL SPONDYLOSIS: ICD-10-CM

## 2023-06-19 DIAGNOSIS — M54.16 LUMBAR RADICULOPATHY: ICD-10-CM

## 2023-06-19 DIAGNOSIS — M47.816 LUMBAR SPONDYLOSIS: ICD-10-CM

## 2023-06-19 PROCEDURE — 72052 X-RAY EXAM NECK SPINE 6/>VWS: CPT | Performed by: PHYSICIAN ASSISTANT

## 2023-06-19 PROCEDURE — 72114 X-RAY EXAM L-S SPINE BENDING: CPT | Performed by: PHYSICIAN ASSISTANT

## 2023-06-20 DIAGNOSIS — I10 PRIMARY HYPERTENSION: Primary | Chronic | ICD-10-CM

## 2023-06-20 RX ORDER — HYDROCHLOROTHIAZIDE 25 MG/1
25 TABLET ORAL DAILY
Qty: 90 TABLET | Refills: 3 | Status: SHIPPED | OUTPATIENT
Start: 2023-06-20

## 2023-07-07 ENCOUNTER — HOSPITAL ENCOUNTER (OUTPATIENT)
Dept: MRI IMAGING | Age: 53
Discharge: HOME | End: 2023-07-07
Attending: PHYSICIAN ASSISTANT
Payer: COMMERCIAL

## 2023-07-07 DIAGNOSIS — M54.16 LUMBAR RADICULOPATHY: ICD-10-CM

## 2023-07-07 DIAGNOSIS — M47.816 LUMBAR SPONDYLOSIS: ICD-10-CM

## 2023-07-07 PROCEDURE — 72148 MRI LUMBAR SPINE W/O DYE: CPT | Performed by: PHYSICIAN ASSISTANT

## 2023-07-10 ENCOUNTER — OFFICE VISIT (OUTPATIENT)
Dept: PAIN CLINIC | Facility: CLINIC | Age: 53
End: 2023-07-10
Payer: COMMERCIAL

## 2023-07-10 VITALS
WEIGHT: 135 LBS | BODY MASS INDEX: 22 KG/M2 | OXYGEN SATURATION: 98 % | DIASTOLIC BLOOD PRESSURE: 72 MMHG | HEART RATE: 85 BPM | SYSTOLIC BLOOD PRESSURE: 124 MMHG

## 2023-07-10 DIAGNOSIS — M48.061 LUMBAR FORAMINAL STENOSIS: Primary | ICD-10-CM

## 2023-07-10 DIAGNOSIS — M54.16 LUMBAR RADICULITIS: ICD-10-CM

## 2023-07-10 NOTE — PROGRESS NOTES
Patient presents in office today with reported pain in lumbar spine    Current pain level reported = 7-8/10    Last reported dose of tizanidine and ibuprofen today      Narcotic Contract renewal none    Urine Drug screen none

## 2023-07-12 ENCOUNTER — TELEPHONE (OUTPATIENT)
Dept: PAIN CLINIC | Facility: CLINIC | Age: 53
End: 2023-07-12

## 2023-07-12 DIAGNOSIS — M54.16 LUMBAR RADICULITIS: Primary | ICD-10-CM

## 2023-07-12 NOTE — TELEPHONE ENCOUNTER
Patient advised of insurance approval to proceed with injections and is agreeable to scheduling. Patient scheduled for procedure, pre-procedure instructions reviewed. Patient prefers conscious  sedation. Reviewed sedation instructions including Fasting 8 hours prior, Required and No Covid Test Required. Patient has no medications to hold prior to procedure. Advised patient that she can take blood pressure medication w/sip of water morning of procedure. Patient verbalized understanding of instructions, no further needs at this time. [address]  PRE-PROCEDURE INSTRUCTIONS WITH IV SEDATION     Procedure: Right L3/4,L4/5 TLESI    Appointment Date: 07/25/2023    Check-In Time: 12:45 PM    Follow-Up Date/Time with Natasha Lita : 08/07/2023 @ 01:00 PM      Prior to the procedure:  Please update us prior to the procedure if you are experiencing any symptoms of infection such as cough, fever, chills, urinary symptoms, or have recently been prescribed antibiotics, have open wounds, have recently had surgery or dental procedures. Day of Procedure:  Do not eat or drink anything (including water) 8 hours prior to your procedure. If you take morning blood pressure medication or oral diabetic medication, please take with a small sip of water. You are required to have a responsible adult drive you home after your procedure. You may not take a cab or ride share unless you have a responsible adult with you in the cab or ride share. A family member or friend is required to stay in our waiting room or hospital garage because of the sedation you will receive, you may be sleepy and forgetful. You may not remember anything told to you after your procedure including discharge instructions. Please note: children are not allowed in the holding area so please make appropriate arrangements.   Any additional family members and friends will need to remain in the surgical waiting room or hospital garage for the duration of your procedure. *If your family member or friend elects to wait in the garage, they must leave a cell phone number with the lab staff in case they need to be reached. Please park in the [facility] parking garage and follow the signs to the [facility]. Please bring your Insurance Card, Photo ID, List of Current Medications and Referral (if applicable) to your appointment. Check in at [facility] ([address] [place], [place]) outpatient registration in the [facility]. Please note-No prescriptions will be written by Pain Clinic in OR on the day of procedure. If you require a refill of medications, please contact the office 48 hours prior to your procedure. If you have an implanted Spinal Cord or Peripheral Nerve Stimulator: Please remember to turn device off for procedure    *If you are fasting, you may take blood pressure and thyroid medications with a small sip of water the day of your procedure. *If you are diabetic, your glucose must be within a normal range for you. If you are fasting, you should check your glucose levels and adjust with medication if needed. Medication Hold:  Number of days you need to be off for the following medications:    Aggrenox 10 days   Agrylin (Anagrelide) 10 days  Brilinta (Ticagrelor) 7 days  Imbruvica (Ibrutinib) 3 days   Enbrel (Etanercept) 24 hours   Fragmin (Dalteparin) 24 hours   Pletal (Cilostazol) 7 days  Effient (Prasugrel) 7 days  Pradaxa 10 days  Trental 7 days  Eliquis (Apixaban) 3 days  Xarelto (Rivaroxaban) 3 days  Lovenox (Enoxaparin) 24 hours  Aspirin  Greater than 81mg but less than 325mg   5 days  325mg and greater                  7 days  (*81 mg      24 hours preferred, but not required)  Coumadin       5 days  Procedure may be cancelled if INR is elevated.    Excedrin (with aspirin) 7 days  Plavix (Clopidogrel)                             7 days    NSAIDs: 24 hours preferred, but not required      Ibuprofen (Motrin, Advil, Vicoprofen), Naproxen (Naprosyn, Aleve), Piroxcam (Feldene), Meloxicam (Mobic), Oxaprozin (Daypro), Diclofenac (Voltaren), Indomethacin (Indocin), Etodolac (Lodine), Nabumetone (Relafen), Celebrex (Celecoxib)           HERBAL SUPPLEMENTS  5 days preferred, but not required  Fish oil, krill oil, Omega-3, Vascepa, Vitamin E, Turmeric, Garlic                       Insurance Authorization:   Most insurances are now requiring a preauthorization for all procedures. Please contact your insurance carrier to determine what your financial responsibility will be for the procedure(s). Cancellation/Rescheduling Appointment:   In the event you need to cancel or reschedule your appointment, you must notify the office 24 hours prior. Post-procedure instructions:        Please schedule a follow up visit within 2 to 4 weeks after your last procedure date   Please call our office with any questions or concerns before or after your procedure at [phone], #2. If you are a diabetic, please increase the frequency of your glucose monitoring after the procedure as this may cause a temporary increase in your blood sugar. Contact your primary care physician if your blood sugar rises as you may require some medication adjustment. It is normal to have increased pain at injection site for up to 3-5 days after procedure, you can        use heat or ice (20 minutes on 20 minutes off) for comfort.

## 2023-07-12 NOTE — TELEPHONE ENCOUNTER
Question Answer   Anesthesia Type Sedation   Provider [facility] Procedure Lab   Procedure Transforaminal   Laterality/Level right L3/4 and L4/5 TF-KENNY   CPT (Hit enter after each entry) INJECTION, ANESTHETIC/STEROID, TRANSFORAMINAL EPIDURAL; LUMBAR/SACRAL, SINGLE LEVEL    INJECTION, ANESTHETIC/STEROID, TRANSFORAMINAL EPIDURAL; LUMBAR/SACRAL, ADD'L LEVEL   Medical clearance requested (will send to Pain Navigator) No   Patient has Medicare coverage?  No

## 2023-07-14 PROBLEM — Z59.9 FINANCIAL DIFFICULTIES: Status: ACTIVE | Noted: 2023-07-14

## 2023-07-14 PROBLEM — R45.4 IRRITABILITY AND ANGER: Status: ACTIVE | Noted: 2023-07-14

## 2023-07-19 ENCOUNTER — OFFICE VISIT (OUTPATIENT)
Dept: ORTHOPEDICS CLINIC | Facility: CLINIC | Age: 53
End: 2023-07-19
Payer: COMMERCIAL

## 2023-07-19 VITALS — WEIGHT: 135 LBS | HEIGHT: 66 IN | BODY MASS INDEX: 21.69 KG/M2

## 2023-07-19 DIAGNOSIS — M79.673 FOOT ARCH PAIN, UNSPECIFIED LATERALITY: ICD-10-CM

## 2023-07-19 DIAGNOSIS — Z96.9 PRESENCE OF RETAINED HARDWARE: ICD-10-CM

## 2023-07-19 DIAGNOSIS — M20.42 HAMMER TOES OF BOTH FEET: Primary | ICD-10-CM

## 2023-07-19 DIAGNOSIS — M21.619 BUNION: ICD-10-CM

## 2023-07-19 DIAGNOSIS — R25.2 FOOT CRAMPS: ICD-10-CM

## 2023-07-19 DIAGNOSIS — M20.41 HAMMER TOES OF BOTH FEET: Primary | ICD-10-CM

## 2023-07-19 PROCEDURE — 99203 OFFICE O/P NEW LOW 30 MIN: CPT | Performed by: PODIATRIST

## 2023-07-19 PROCEDURE — 3008F BODY MASS INDEX DOCD: CPT | Performed by: PODIATRIST

## 2023-07-21 ENCOUNTER — TELEPHONE (OUTPATIENT)
Dept: INTERNAL MEDICINE CLINIC | Facility: CLINIC | Age: 53
End: 2023-07-21

## 2023-07-21 DIAGNOSIS — L65.9 HAIR THINNING: ICD-10-CM

## 2023-07-21 DIAGNOSIS — B36.0 TINEA VERSICOLOR: Primary | ICD-10-CM

## 2023-07-23 NOTE — TELEPHONE ENCOUNTER
PATIENT NAME:  Donald LANCE/ [phone] (home)/ There is no work phone number on file. PATIENT :  02/10/1970  REFERRAL ID #:  [id number]  REFERRAL STATUS:  Authorized [1]  REVIEW REFERRAL NOTES FOR MORE INFORMATION:  DATE AUTHORIZED:  07/10/2023 // Chioma Pang DATE: 2024   REFERRED BY:  Chet Chopra MD (TEL: [phone])  REFERRED TO: No referral provider, MD (TEL: No Referred to Provider on Referral)     No vendor specified on referral. / No vendor phone number known.   No facility specified on referral  REFERRED FOR:    Diagnoses:    M48.061 (ICD-10-CM) - 724.02 (ICD-9-CM) - Lumbar foraminal stenosis    M54.16 (ICD-10-CM) - 724.4 (ICD-9-CM) - Lumbar radiculitis  Procedures:     [id number] - [facility] PAIN NAVIGATOR   NUMBER OF VISITS AUTH: 1 2 = A lot of assistance

## 2023-07-25 ENCOUNTER — APPOINTMENT (OUTPATIENT)
Dept: GENERAL RADIOLOGY | Facility: HOSPITAL | Age: 53
End: 2023-07-25
Attending: ANESTHESIOLOGY
Payer: COMMERCIAL

## 2023-07-25 ENCOUNTER — HOSPITAL ENCOUNTER (OUTPATIENT)
Facility: HOSPITAL | Age: 53
Setting detail: HOSPITAL OUTPATIENT SURGERY
Discharge: HOME | End: 2023-07-25
Attending: ANESTHESIOLOGY | Admitting: ANESTHESIOLOGY
Payer: COMMERCIAL

## 2023-07-25 VITALS
SYSTOLIC BLOOD PRESSURE: 120 MMHG | OXYGEN SATURATION: 100 % | TEMPERATURE: 98 F | RESPIRATION RATE: 18 BRPM | DIASTOLIC BLOOD PRESSURE: 79 MMHG | HEART RATE: 78 BPM

## 2023-07-25 PROCEDURE — 3074F SYST BP LT 130 MM HG: CPT | Performed by: ANESTHESIOLOGY

## 2023-07-25 PROCEDURE — 3078F DIAST BP <80 MM HG: CPT | Performed by: ANESTHESIOLOGY

## 2023-07-25 PROCEDURE — 64483 NJX AA&/STRD TFRM EPI L/S 1: CPT | Performed by: ANESTHESIOLOGY

## 2023-07-25 PROCEDURE — 3E0U33Z INTRODUCTION OF ANTI-INFLAMMATORY INTO JOINTS, PERCUTANEOUS APPROACH: ICD-10-PCS | Performed by: ANESTHESIOLOGY

## 2023-07-25 PROCEDURE — 64484 NJX AA&/STRD TFRM EPI L/S EA: CPT | Performed by: ANESTHESIOLOGY

## 2023-07-25 RX ORDER — LIDOCAINE HYDROCHLORIDE 10 MG/ML
INJECTION, SOLUTION EPIDURAL; INFILTRATION; INTRACAUDAL; PERINEURAL
Status: DISCONTINUED | OUTPATIENT
Start: 2023-07-25 | End: 2023-07-25

## 2023-07-25 RX ORDER — DEXAMETHASONE SODIUM PHOSPHATE 10 MG/ML
INJECTION, SOLUTION INTRAMUSCULAR; INTRAVENOUS
Status: DISCONTINUED | OUTPATIENT
Start: 2023-07-25 | End: 2023-07-25

## 2023-07-25 RX ORDER — SODIUM CHLORIDE 9 MG/ML
INJECTION INTRAVENOUS
Status: DISCONTINUED | OUTPATIENT
Start: 2023-07-25 | End: 2023-07-25

## 2023-07-25 NOTE — DISCHARGE INSTRUCTIONS
You have been given medication that makes you sleepy. This may have included both pain medication and sleeping medication. Most of the effects have worn off but you may still have some drowsiness for the next 6 to 8 hours. Home Care  Follow these guidelines when you get home:    --Don't drink any alcohol for the next 24 hours. --Don't drive, operate dangerous machinery, make important business or personal    decisions, or sign legal documents during the next 24 hours. Follow Up Care  Follow up with your healthcare provider if you are not alert and back to your usual level of activity within 12 hours    When to seek medical advice  Call your healthcare provider right away if any of these occur:    --Drowsiness that gets worse  --Weakness or dizziness that gets worse  --Repeated vomiting  --You can not be awakened   Home Care Instructions Following Your Pain Procedure     Rufina,  It has been a pleasure to have you as our patient. To help you at home, you must follow these general discharge instructions. We will review these with you before you are discharged. It is our hope that you have a complete and uneventful recovery from our procedure. General Instructions:  What to Expect:  Bandages from your procedure today can be removed when you get home. Please avoid soaking and/or swimming for 24 hours. Showering is okay  It is normal to have increased pain symptoms and/or pain at injection site for up to 3-5 days after procedure, you can use heat or ice (20 minutes on 20 minutes off) for comfort. You may experience some temporary side effects which may include restlessness or insomnia, flushing of the face, or heart palpitations. Please contact the provider if these symptoms do not resolve within 3-4 days. Lightheadedness or nausea may occur and should resolve within 24 to 48 hours.   If you develop a headache after treatment, rest, drink fluids (with caffeine, if possible) and take mild over-the-counter pain medication. If the headache does not improve with the above treatment, contact the physician. Home Medications:  Resume all previously prescribed medication. Please avoid taking NSAIDs (Non-Steriodal Anti-Inflammatory Drugs) such as:  Ibuprofen ( Advil, Motrin) Aleve (Naproxen), Diclofenac, Meloxicam for 6 hours after procedure. If you are on Coumadin (Warfarin) or any other anti-coagulant (or \"blood thinning\") medication such as Plavix (Clopidogrel), Xarelto (Rivaroxaban), Eliquis (Apixaban), Effient (Prasugrel) etc., restart on the following day from the procedure unless otherwise directed by your provider. If you are a diabetic, please increase the frequency of your glucose monitoring after the procedure as steroids may cause a temporary (2-3 day) increase in your blood sugar. Contact your primary care physician if your blood sugar remains elevated as you may require some medication adjustment. Diet:  Resume your regular diet as tolerated. Activity: We recommend that you relax and rest during the rest of your procedure day. If you feel weakness in your arms or legs do not drive. Follow-up Appointment  Please schedule a follow-up visit within 3 to 4 weeks after your last procedure date. Question or Concerns:  Feel free to call our office with any questions or concerns at [phone] (option #2)    Leighann Cowart  Thank you for coming to [facility] for your procedure. The nurses try very hard to make sure you receive the best care possible. Your trust in them as well as us is greatly appreciated.     Thanks so much,   Dr. Luis A Bronson

## 2023-07-25 NOTE — H&P
History & Physical Examination    Patient Name: Sylvia Perez  MRN: [id number]  CSN: [id number]  YOB: 1970    Pre-Operative Diagnosis:  Lumbar radiculitis [M54.16]    Present Illness: Patient with lumbar radiculitis    FOLIC ACID 1 MG Oral Tab, TAKE 1 TABLET BY MOUTH DAILY, Disp: 30 tablet, Rfl: 0  MONTELUKAST 10 MG Oral Tab, TAKE ONE TABLET BY MOUTH NIGHTLY, Disp: 30 tablet, Rfl: 0  propranolol 10 MG Oral Tab, Take 1-2 tablets (10-20 mg total) by mouth 3 (three) times daily as needed (for anxiety/panic). , Disp: 90 tablet, Rfl: 0  FLUoxetine 20 MG Oral Cap, Take 1 capsule (20 mg total) by mouth daily. , Disp: 90 capsule, Rfl: 0  prazosin 1 MG Oral Cap, Take 2 capsules (2 mg total) by mouth nightly., Disp: 60 capsule, Rfl: 0  Amphetamine-Dextroamphet ER (ADDERALL XR) 20 MG Oral Capsule SR 24 Hr, Take 2 capsules (40 mg total) by mouth every morning., Disp: 60 capsule, Rfl: 0  traZODone 50 MG Oral Tab, Take 1-2 tablets ( mg total) by mouth nightly. insomnia, Disp: 180 tablet, Rfl: 0  ARIPiprazole 5 MG Oral Tab, Take 1 tablet (5 mg total) by mouth daily. , Disp: 90 tablet, Rfl: 0  hydroCHLOROthiazide 25 MG Oral Tab, Take 1 tablet (25 mg total) by mouth daily. , Disp: 90 tablet, Rfl: 3  SUMAtriptan Succinate 100 MG Oral Tab, , Disp: , Rfl:   TIZANIDINE 4 MG Oral Tab, Take 1 tablet by mouth every 8 hours as needed. , Disp: 30 tablet, Rfl: 2  clonazePAM 0.5 MG Oral Tablet Dispersible, Take 1 tablet (0.5 mg total) by mouth 4 (four) times daily as needed (anxiety and/or insomnia). , Disp: 90 tablet, Rfl: 2  NIFEdipine ER 30 MG Oral Tablet 24 Hr, Take 1 tablet (30 mg total) by mouth daily. , Disp: 90 tablet, Rfl: 1  atorvastatin 10 MG Oral Tab, Take 1 tablet (10 mg total) by mouth nightly., Disp: 90 tablet, Rfl: 1  SUMAtriptan 50 MG Oral Tab, Take 1 tablet (50 mg total) by mouth every 2 (two) hours as needed.  NO MORE THAN 4 TABLETS IN 24 HOURS., Disp: , Rfl:   [] potassium chloride 20 MEQ Oral Tab CR, Take 1 tablet (20 mEq total) by mouth 2 (two) times daily for 2 days. (Patient not taking: Reported on 5/11/2023), Disp: 4 tablet, Rfl: 0  ipratropium 0.03 % Nasal Solution, 1 spray by Nasal route daily. , Disp: 30 mL, Rfl: 2  topiramate 100 MG Oral Tab, Take 1 tablet (100 mg total) by mouth 2 (two) times daily. , Disp: 60 tablet, Rfl: 5  azelastine 0.1 % Nasal Solution, 1 spray by Nasal route 2 (two) times daily. , Disp: 30 mL, Rfl: 3  selenium sulfide 2.5 % External Lotion, Apply 1 Application. topically daily as needed (tinea versicolor). , Disp: 118 mL, Rfl: 1  ketoconazole 2 % External Cream, Apply under breast folds twice daily as needed, Disp: 60 g, Rfl: 1  levocetirizine 5 MG Oral Tab, Take 1 tablet (5 mg total) by mouth every evening., Disp: , Rfl:       No current facility-administered medications for this encounter.       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 Meron Arreola)    COLONOSCOPY      CYST ASPIRATION LEFT      CYST ASPIRATION RIGHT      HYSTERECTOMY  09/11/2012    for fibriods- supracervical- still has cervix and ovaries    OTHER Right     bonion removal    OTHER SURGICAL HISTORY 2009    colposcopy    OTHER SURGICAL HISTORY  2015    abdominoplasty    OTHER SURGICAL HISTORY Right 2/23/2016    bunion surgery    OTHER SURGICAL HISTORY Bilateral 10/11/16    Breast reduction surgery - Dr. Ubaldo Holm LEFT      10/2016    REDUCTION RIGHT  10/2016    TUBAL LIGATION  2002     Family History   Problem Relation Age of Onset    Hypertension Father     ADHD Father     Heart Disorder Mother 32        cardiac arrest -- possibly related to medication interaction / possible overdose    Alcohol and Other Disorders Associated Mother     Substance Abuse Mother     Bipolar Disorder Mother     ADHD Daughter     Bipolar Disorder Son     ADHD Son     ADHD Son     Alcohol and Other Disorders Associated Maternal Grandmother     Other (Other) Maternal Grandmother         alcoholic cirrhosis    Hypertension Sister     Other (Other) Sister         lymphedema    Homicide History Cousin     Cancer Neg     Stroke Neg      Social History    Tobacco Use      Smoking status: Never      Smokeless tobacco: Never    Alcohol use: Not Currently      Comment: last drink 2/2023 daily drinking before 2/2023 \"many many years\"      SYSTEM Check if Review is Normal Check if Physical Exam is Normal If not normal, please explain:   HEENT [x ] [x ]    NECK & BACK [x ] [x ]    HEART [x ] [x ]    LUNGS [x ] [x ]    ABDOMEN [x ] [x ]    UROGENITAL [x ] [x ]    EXTREMITIES [x ] [x ]    OTHER        [ x ] I have discussed the risks and benefits and alternatives with the patient/family. They understand and agree to proceed with plan of care. [ x ] I have reviewed the History and Physical done within the last 30 days. Any changes noted above.     Rossy Hernandez MD

## 2023-07-26 ENCOUNTER — TELEPHONE (OUTPATIENT)
Dept: PAIN CLINIC | Facility: CLINIC | Age: 53
End: 2023-07-26

## 2023-07-26 NOTE — TELEPHONE ENCOUNTER
Courtesy called placed to patient for post procedure follow up. Patient stated she is feeling. Informed patient that soreness is to be expected after the procedure. . Encouraged patient to alternate ice and heat. Pt verbalized understanding to call with any questions or concerns.       Procedure: Right L3/4,L4/5 TLESI   Date: 7/25/23  Follow up Visit Scheduled: 8/7/23 1p with MASTER Herrera

## 2023-07-31 ENCOUNTER — OFFICE VISIT (OUTPATIENT)
Dept: PAIN CLINIC | Facility: CLINIC | Age: 53
End: 2023-07-31
Payer: COMMERCIAL

## 2023-07-31 VITALS — SYSTOLIC BLOOD PRESSURE: 112 MMHG | HEART RATE: 88 BPM | DIASTOLIC BLOOD PRESSURE: 62 MMHG | OXYGEN SATURATION: 98 %

## 2023-07-31 DIAGNOSIS — M47.812 CERVICAL SPONDYLOSIS: Primary | ICD-10-CM

## 2023-07-31 PROCEDURE — 99214 OFFICE O/P EST MOD 30 MIN: CPT | Performed by: PHYSICIAN ASSISTANT

## 2023-07-31 PROCEDURE — 3074F SYST BP LT 130 MM HG: CPT | Performed by: PHYSICIAN ASSISTANT

## 2023-07-31 PROCEDURE — 3078F DIAST BP <80 MM HG: CPT | Performed by: PHYSICIAN ASSISTANT

## 2023-07-31 NOTE — PROGRESS NOTES
Patient presents in office today with reported pain in bilateral neck and bilateral low back    Current pain level reported = 6-7/10    Last reported dose of n/a      Narcotic Contract renewal n/a    Urine Drug screen n/a

## 2023-08-04 ENCOUNTER — TELEPHONE (OUTPATIENT)
Dept: PAIN CLINIC | Facility: CLINIC | Age: 53
End: 2023-08-04

## 2023-08-04 DIAGNOSIS — M47.812 CERVICAL SPONDYLOSIS: Primary | ICD-10-CM

## 2023-08-04 NOTE — TELEPHONE ENCOUNTER
Letter 2023  1:14 PM Joshua Pagan Auto: [id number] P REVIEW RFL -   Note:     PATIENT NAME:  Gloria LANCE/ [phone] (home)/ There is no work phone number on file. PATIENT :  02/10/1970  REFERRAL ID #:  [id number]  REFERRAL STATUS:  Authorized [1]  REVIEW REFERRAL NOTES FOR MORE INFORMATION:  DATE AUTHORIZED:  2023 // Marce Frame DATE: 2024   REFERRED BY:  Michael Araujo MD (TEL: [phone])  REFERRED TO: Belinda Tom MD (TEL: [phone])     No vendor specified on referral. / No vendor phone number known.   Edw Edward  REFERRED FOR:    Diagnoses:    N73.422 (ICD-10-CM) - 721.0 (ICD-9-CM) - Cervical spondylosis  Procedures:     [id number] - [facility] PAIN NAVIGATOR   NUMBER OF VISITS AUTH: 1

## 2023-08-04 NOTE — TELEPHONE ENCOUNTER
Order Questions    Question Answer   Anesthesia Type Sedation   Provider [facility] Procedure Lab   Procedure Facet   Laterality/Level R C4/5, C5/6, C6/7   CPT (Hit enter after each entry) INJ PARAVERT F JNT C/T 1 LEV    INJ PARAVERT F JNT C/T 2 LEV    INJ PARAVERT F JNT C/T 3 LEV   Medical clearance requested (will send to Pain Navigator) No   Patient has Medicare coverage?  No

## 2023-08-04 NOTE — TELEPHONE ENCOUNTER
Type Date User Summary Attachment   Letter 2023  1:15 PM Shan Munoz Auto: [id number] IHP REVIEW RFL -   Note:     PATIENT NAME:  Darron LANCE/ [phone] (home)/ There is no work phone number on file. PATIENT :  02/10/1970  REFERRAL ID #:  [id number]  REFERRAL STATUS:  Authorized [1]  REVIEW REFERRAL NOTES FOR MORE INFORMATION:  DATE AUTHORIZED:  2023 // Earl Barker DATE: 2024   REFERRED BY:  Mireya Quintana MD (TEL: [phone])  REFERRED TO: Tonio Vega MD (TEL: [phone])     No vendor specified on referral. / No vendor phone number known.   Edw Edward  REFERRED FOR:    Diagnoses:    F89.072 (ICD-10-CM) - 721.0 (ICD-9-CM) - Cervical spondylosis  Procedures:     [id number] - Replaced by [facility] PAIN NAVIGATOR   NUMBER OF VISITS AUTH: 1

## 2023-08-04 NOTE — TELEPHONE ENCOUNTER
Order Questions    Question Answer   Anesthesia Type Sedation   Provider Vicente Santana   Procedure Facet   Laterality/Level left C4/5, C5/6, C6/7   CPT (Hit enter after each entry) INJ PARAVERT F JNT C/T 1 LEV    INJ PARAVERT F JNT C/T 2 LEV    INJ PARAVERT F JNT C/T 3 LEV   Medical clearance requested (will send to Pain Navigator) No   Patient has Medicare coverage?  No

## 2023-08-04 NOTE — TELEPHONE ENCOUNTER
Patient advised of insurance approval to proceed with injections and is agreeable to scheduling. Patient scheduled for procedure, pre-procedure instructions reviewed. Patient prefers conscious sedation. Reviewed sedation instructions including  required, fasting required. Patient denies taking medications that require holding prior to procedure. Patient verbalized understanding of instructions, no further needs at this time. [address]  PRE-PROCEDURE INSTRUCTIONS WITH IV SEDATION     Procedure #1: Right C4/5, C5/6, C6/7 facet injections    Appointment Date: 8/15/23       Check-In Time: 7:45am     Procedure #2: Left C4/5, C5/6, C6/7 facet injections    Appointment Date: 8/22/23       Check-In Time: 7:45am      Prior to the procedure:  Please update us prior to the procedure if you are experiencing any symptoms of infection such as cough, fever, chills, urinary symptoms, or have recently been prescribed antibiotics, have open wounds, have recently had surgery or dental procedures. Day of Procedure:  Do not eat or drink anything (including water) 8 hours prior to your procedure. If you take morning blood pressure medication or oral diabetic medication, please take with a small sip of water. You are required to have a responsible adult drive you home after your procedure. You may not take a cab or ride share unless you have a responsible adult with you in the cab or ride share. A family member or friend is required to stay in our waiting room or hospital garage because of the sedation you will receive, you may be sleepy and forgetful. You may not remember anything told to you after your procedure including discharge instructions. Please note: children are not allowed in the holding area so please make appropriate arrangements. Any additional family members and friends will need to remain in the surgical waiting room or hospital garage for the duration of your procedure.  *If your family member or friend elects to wait in the garage, they must leave a cell phone number with the lab staff in case they need to be reached. Please park in the [employer] parking garage and follow the signs to the [facility]. Please bring your Insurance Card, Photo ID, List of Current Medications and Referral (if applicable) to your appointment. Check in at [facility] ([address]. [place] [address] [place], [address] [facility]) outpatient registration in the [facility]. Please note-No prescriptions will be written by Pain Clinic in OR on the day of procedure. If you require a refill of medications, please contact the office 48 hours prior to your procedure. If you have an implanted Spinal Cord or Peripheral Nerve Stimulator: Please remember to turn device off for procedure    *If you are fasting, you may take blood pressure and thyroid medications with a small sip of water the day of your procedure. *If you are diabetic, your glucose must be within a normal range for you. If you are fasting, you should check your glucose levels and adjust with medication if needed. Medication Hold:  Number of days you need to be off for the following medications:    Aggrenox 10 days   Agrylin (Anagrelide) 10 days  Brilinta (Ticagrelor) 7 days  Imbruvica (Ibrutinib) 3 days   Enbrel (Etanercept) 24 hours   Fragmin (Dalteparin) 24 hours   Pletal (Cilostazol) 7 days  Effient (Prasugrel) 7 days  Pradaxa 10 days  Trental 7 days  Eliquis (Apixaban) 3 days  Xarelto (Rivaroxaban) 3 days  Lovenox (Enoxaparin) 24 hours  Aspirin  Greater than 81mg but less than 325mg   5 days  325mg and greater                  7 days  (*81 mg      24 hours preferred, but not required)  Coumadin       5 days  Procedure may be cancelled if INR is elevated.    Excedrin (with aspirin) 7 days  Plavix (Clopidogrel)                             7 days    NSAIDs: 24 hours preferred, but not required      Ibuprofen (Motrin, Advil, Vicoprofen), Naproxen (Naprosyn, Aleve), Piroxcam (Feldene), Meloxicam (Mobic), Oxaprozin (Daypro), Diclofenac (Voltaren), Indomethacin (Indocin), Etodolac (Lodine), Nabumetone (Relafen), Celebrex (Celecoxib)           HERBAL SUPPLEMENTS  5 days preferred, but not required  Fish oil, krill oil, Omega-3, Vascepa, Vitamin E, Turmeric, Garlic                       Insurance Authorization:   Most insurances are now requiring a preauthorization for all procedures. Please contact your insurance carrier to determine what your financial responsibility will be for the procedure(s). Cancellation/Rescheduling Appointment:   In the event you need to cancel or reschedule your appointment, you must notify the office 24 hours prior. Post-procedure instructions:        Please schedule a follow up visit within 2 to 4 weeks after your last procedure date   Please call our office with any questions or concerns before or after your procedure at [phone], #[phone]. If you are a diabetic, please increase the frequency of your glucose monitoring after the procedure as this may cause a temporary increase in your blood sugar. Contact your primary care physician if your blood sugar rises as you may require some medication adjustment. It is normal to have increased pain at injection site for up to 3-5 days after procedure, you can        use heat or ice (20 minutes on 20 minutes off) for comfort.

## 2023-08-10 DIAGNOSIS — J30.9 CHRONIC ALLERGIC RHINITIS: ICD-10-CM

## 2023-08-10 DIAGNOSIS — I73.00 RAYNAUD'S DISEASE WITHOUT GANGRENE: ICD-10-CM

## 2023-08-10 DIAGNOSIS — E78.00 PURE HYPERCHOLESTEROLEMIA: ICD-10-CM

## 2023-08-10 RX ORDER — TOPIRAMATE 100 MG/1
100 TABLET, FILM COATED ORAL 2 TIMES DAILY
Qty: 60 TABLET | Refills: 5 | Status: CANCELLED | OUTPATIENT
Start: 2023-08-10

## 2023-08-11 RX ORDER — IPRATROPIUM BROMIDE 21 UG/1
1 SPRAY, METERED NASAL DAILY
Qty: 30 ML | Refills: 2 | Status: SHIPPED | OUTPATIENT
Start: 2023-08-11

## 2023-08-11 RX ORDER — SUMATRIPTAN 50 MG/1
50 TABLET, FILM COATED ORAL EVERY 2 HOUR PRN
Qty: 12 TABLET | Refills: 3 | Status: SHIPPED | OUTPATIENT
Start: 2023-08-11

## 2023-08-11 RX ORDER — ATORVASTATIN CALCIUM 10 MG/1
10 TABLET, FILM COATED ORAL NIGHTLY
Qty: 90 TABLET | Refills: 1 | Status: SHIPPED | OUTPATIENT
Start: 2023-08-11

## 2023-08-11 RX ORDER — NIFEDIPINE 30 MG/1
30 TABLET, FILM COATED, EXTENDED RELEASE ORAL DAILY
Qty: 90 TABLET | Refills: 1 | Status: SHIPPED | OUTPATIENT
Start: 2023-08-11

## 2023-08-11 NOTE — TELEPHONE ENCOUNTER
Atorvastatin 10 mg  Filled 5-23-23  Qty 90  1 refill  No upcoming appt. LOV 5-18-22 RP  Labs 5-9-23 Lipid    Ipratropium nasal soln. Filled 4-10-23  Qty 30 mL  2 refills  No upcoming appt. LOV 5-18-22 RP    Nidedipine ER 30 mg  Filled 5-25-23  Qty 90  1 refill  No upcoming appt.   LOV 5-25-23  [facility] [id number]    Sumatriptan Is prescribed by Dr. Mya Cox 7-10-23  Qty 9

## 2023-08-15 ENCOUNTER — HOSPITAL ENCOUNTER (OUTPATIENT)
Facility: HOSPITAL | Age: 53
Setting detail: HOSPITAL OUTPATIENT SURGERY
Discharge: HOME | End: 2023-08-15
Attending: ANESTHESIOLOGY | Admitting: ANESTHESIOLOGY
Payer: COMMERCIAL

## 2023-08-15 ENCOUNTER — APPOINTMENT (OUTPATIENT)
Dept: GENERAL RADIOLOGY | Facility: HOSPITAL | Age: 53
End: 2023-08-15
Attending: ANESTHESIOLOGY
Payer: COMMERCIAL

## 2023-08-15 VITALS
RESPIRATION RATE: 16 BRPM | DIASTOLIC BLOOD PRESSURE: 79 MMHG | TEMPERATURE: 97 F | HEART RATE: 84 BPM | OXYGEN SATURATION: 97 % | SYSTOLIC BLOOD PRESSURE: 111 MMHG

## 2023-08-15 DIAGNOSIS — J30.9 CHRONIC ALLERGIC RHINITIS: ICD-10-CM

## 2023-08-15 PROCEDURE — 64492 INJ PARAVERT F JNT C/T 3 LEV: CPT | Performed by: ANESTHESIOLOGY

## 2023-08-15 PROCEDURE — 64491 INJ PARAVERT F JNT C/T 2 LEV: CPT | Performed by: ANESTHESIOLOGY

## 2023-08-15 PROCEDURE — 99152 MOD SED SAME PHYS/QHP 5/>YRS: CPT | Performed by: ANESTHESIOLOGY

## 2023-08-15 PROCEDURE — B01BZZZ FLUOROSCOPY OF SPINAL CORD: ICD-10-PCS | Performed by: ANESTHESIOLOGY

## 2023-08-15 PROCEDURE — 64490 INJ PARAVERT F JNT C/T 1 LEV: CPT | Performed by: ANESTHESIOLOGY

## 2023-08-15 PROCEDURE — 3E0R33Z INTRODUCTION OF ANTI-INFLAMMATORY INTO SPINAL CANAL, PERCUTANEOUS APPROACH: ICD-10-PCS | Performed by: ANESTHESIOLOGY

## 2023-08-15 PROCEDURE — 3E0R3BZ INTRODUCTION OF ANESTHETIC AGENT INTO SPINAL CANAL, PERCUTANEOUS APPROACH: ICD-10-PCS | Performed by: ANESTHESIOLOGY

## 2023-08-15 RX ORDER — MIDAZOLAM HYDROCHLORIDE 1 MG/ML
INJECTION INTRAMUSCULAR; INTRAVENOUS
Status: DISCONTINUED | OUTPATIENT
Start: 2023-08-15 | End: 2023-08-15

## 2023-08-15 RX ORDER — MONTELUKAST SODIUM 10 MG/1
10 TABLET ORAL NIGHTLY
Qty: 90 TABLET | Refills: 1 | Status: SHIPPED | OUTPATIENT
Start: 2023-08-15

## 2023-08-15 RX ORDER — DEXAMETHASONE SODIUM PHOSPHATE 10 MG/ML
INJECTION, SOLUTION INTRAMUSCULAR; INTRAVENOUS
Status: DISCONTINUED | OUTPATIENT
Start: 2023-08-15 | End: 2023-08-15

## 2023-08-15 RX ORDER — LIDOCAINE HYDROCHLORIDE 10 MG/ML
INJECTION, SOLUTION EPIDURAL; INFILTRATION; INTRACAUDAL; PERINEURAL
Status: DISCONTINUED | OUTPATIENT
Start: 2023-08-15 | End: 2023-08-15

## 2023-08-15 RX ORDER — SODIUM CHLORIDE 9 MG/ML
INJECTION INTRAVENOUS
Status: DISCONTINUED | OUTPATIENT
Start: 2023-08-15 | End: 2023-08-15

## 2023-08-15 RX ORDER — DIPHENHYDRAMINE HYDROCHLORIDE 50 MG/ML
50 INJECTION INTRAMUSCULAR; INTRAVENOUS ONCE AS NEEDED
Status: DISCONTINUED | OUTPATIENT
Start: 2023-08-15 | End: 2023-08-15

## 2023-08-15 RX ORDER — SODIUM CHLORIDE, SODIUM LACTATE, POTASSIUM CHLORIDE, CALCIUM CHLORIDE 600; 310; 30; 20 MG/100ML; MG/100ML; MG/100ML; MG/100ML
100 INJECTION, SOLUTION INTRAVENOUS CONTINUOUS
Status: DISCONTINUED | OUTPATIENT
Start: 2023-08-15 | End: 2023-08-15

## 2023-08-15 RX ORDER — ONDANSETRON 2 MG/ML
4 INJECTION INTRAMUSCULAR; INTRAVENOUS ONCE AS NEEDED
Status: DISCONTINUED | OUTPATIENT
Start: 2023-08-15 | End: 2023-08-15

## 2023-08-15 NOTE — H&P
History & Physical Examination    Patient Name: Ruiz Byrne  MRN: [id number]  [facility]: [id number]  YOB: 1970    Pre-Operative Diagnosis:  Cervical spondylosis [C20.956]    Present Illness: Patient with cervical spondylosis    SUMAtriptan 50 MG Oral Tab, Take 1 tablet (50 mg total) by mouth every 2 (two) hours as needed. NO MORE THAN 4 TABLETS IN 24 HOURS., Disp: 12 tablet, Rfl: 3, 8/14/2023  atorvastatin 10 MG Oral Tab, Take 1 tablet (10 mg total) by mouth nightly., Disp: 90 tablet, Rfl: 1, 8/14/2023  NIFEdipine ER 30 MG Oral Tablet 24 Hr, Take 1 tablet (30 mg total) by mouth daily. , Disp: 90 tablet, Rfl: 1, 8/14/2023  ARIPIPRAZOLE 5 MG Oral Tab, Take 1 tablet by mouth every morning., Disp: 30 tablet, Rfl: 0, 8/14/2023  Amphetamine-Dextroamphet ER (ADDERALL XR) 20 MG Oral Capsule SR 24 Hr, Take 2 capsules (40 mg total) by mouth every morning., Disp: 60 capsule, Rfl: 0, 8/14/2023  FLUoxetine 20 MG Oral Cap, Take 1 capsule (20 mg total) by mouth daily. , Disp: 90 capsule, Rfl: 0, 8/14/2023  prazosin 1 MG Oral Cap, Take 2 capsules (2 mg total) by mouth nightly., Disp: 60 capsule, Rfl: 2, 8/14/2023  ergocalciferol 1.25 MG (46998 UT) Oral Cap, Take 1 capsule (50,000 Units total) by mouth every 7 days. , Disp: , Rfl: , 4/17/7007  FOLIC ACID 1 MG Oral Tab, TAKE 1 TABLET BY MOUTH DAILY, Disp: 30 tablet, Rfl: 0, 8/14/2023  MONTELUKAST 10 MG Oral Tab, TAKE ONE TABLET BY MOUTH NIGHTLY, Disp: 30 tablet, Rfl: 0, 8/14/2023  propranolol 10 MG Oral Tab, Take 1-2 tablets (10-20 mg total) by mouth 3 (three) times daily as needed (for anxiety/panic). , Disp: 90 tablet, Rfl: 0, 8/14/2023  traZODone 50 MG Oral Tab, Take 1-2 tablets ( mg total) by mouth nightly. insomnia, Disp: 180 tablet, Rfl: 0, 8/14/2023  hydroCHLOROthiazide 25 MG Oral Tab, Take 1 tablet (25 mg total) by mouth daily. , Disp: 90 tablet, Rfl: 3, 8/14/2023  SUMAtriptan Succinate 100 MG Oral Tab, , Disp: , Rfl: , 8/14/2023  TIZANIDINE 4 MG Oral Tab, Take 1 tablet by mouth every 8 hours as needed. , Disp: 30 tablet, Rfl: 2, 8/14/2023  clonazePAM 0.5 MG Oral Tablet Dispersible, Take 1 tablet (0.5 mg total) by mouth 4 (four) times daily as needed (anxiety and/or insomnia). , Disp: 90 tablet, Rfl: 2, 8/14/2023  topiramate 100 MG Oral Tab, Take 1 tablet (100 mg total) by mouth 2 (two) times daily. , Disp: 60 tablet, Rfl: 5, 8/14/2023  levocetirizine 5 MG Oral Tab, Take 1 tablet (5 mg total) by mouth every evening., Disp: , Rfl: , 8/14/2023  ipratropium 0.03 % Nasal Solution, 1 spray by Nasal route daily. , Disp: 30 mL, Rfl: 2  azelastine 0.1 % Nasal Solution, 1 spray by Nasal route 2 (two) times daily. , Disp: 30 mL, Rfl: 3  selenium sulfide 2.5 % External Lotion, Apply 1 Application. topically daily as needed (tinea versicolor). , Disp: 118 mL, Rfl: 1  ketoconazole 2 % External Cream, Apply under breast folds twice daily as needed, Disp: 60 g, Rfl: 1      lactated ringers infusion, 100 mL/hr, Intravenous, Continuous  ondansetron (Zofran) 4 MG/2ML injection 4 mg, 4 mg, Intravenous, Once PRN        Allergies:   Midrin [Apap-Isomet*    HIVES  Other                   RASH, ITCHING    Comment:Deodorant  Mold                      Pollen                    Trees, Box Elder          Adhesive Tape           RASH  Band-Aid Anti-Itch      RASH    Comment:Band aid glue    Past Medical History:   Diagnosis Date    ADHD     Alcohol use disorder, moderate, dependence (HCC)     Anemia     Anxiety     Chronic rhinitis     High blood pressure     Migraines     Multinodular thyroid 09/16/2016    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 Meron Arreola) COLONOSCOPY      CYST ASPIRATION LEFT      CYST ASPIRATION RIGHT      HYSTERECTOMY  09/11/2012    for fibriods- supracervical- still has cervix and ovaries    OTHER Right     bonion removal    OTHER SURGICAL HISTORY  2009    colposcopy    OTHER SURGICAL HISTORY  2015    abdominoplasty    OTHER SURGICAL HISTORY Right 2/23/2016    bunion surgery    OTHER SURGICAL HISTORY Bilateral 10/11/16    Breast reduction surgery - Dr. Anel Savage LEFT      10/2016    REDUCTION RIGHT  10/2016    TUBAL LIGATION  2002     Family History   Problem Relation Age of Onset    Hypertension Father     ADHD Father     Heart Disorder Mother 32        cardiac arrest -- possibly related to medication interaction / possible overdose    Alcohol and Other Disorders Associated Mother     Substance Abuse Mother     Bipolar Disorder Mother     ADHD Daughter     Bipolar Disorder Son     ADHD Son     ADHD Son     Alcohol and Other Disorders Associated Maternal Grandmother     Other (Other) Maternal Grandmother         alcoholic cirrhosis    Hypertension Sister     Other (Other) Sister         lymphedema    Homicide History Cousin     Cancer Neg     Stroke Neg      Social History    Tobacco Use      Smoking status: Never      Smokeless tobacco: Never    Alcohol use: Not Currently      Comment: last drink 2/2023 daily drinking before 2/2023 \"many many years\"      SYSTEM Check if Review is Normal Check if Physical Exam is Normal If not normal, please explain:   HEENT [x ] [x ]    NECK & BACK [x ] [x ]    HEART [x ] [x ]    LUNGS [x ] [x ]    ABDOMEN [x ] [x ]    UROGENITAL [x ] [x ]    EXTREMITIES [x ] [x ]    OTHER        [ x ] I have discussed the risks and benefits and alternatives with the patient/family. They understand and agree to proceed with plan of care. [ x ] I have reviewed the History and Physical done within the last 30 days. Any changes noted above.     Srikanth Maher MD

## 2023-08-15 NOTE — DISCHARGE INSTRUCTIONS
You have been given medication that makes you sleepy. This may have included both pain medication and sleeping medication. Most of the effects have worn off but you may still have some drowsiness for the next 6 to 8 hours. Home Care  Follow these guidelines when you get home:    --Don't drink any alcohol for the next 24 hours. --Don't drive, operate dangerous machinery, make important business or personal    decisions, or sign legal documents during the next 24 hours. Follow Up Care  Follow up with your healthcare provider if you are not alert and back to your usual level of activity within 12 hours    When to seek medical advice  Call your healthcare provider right away if any of these occur:    --Drowsiness that gets worse  --Weakness or dizziness that gets worse  --Repeated vomiting  --You can not be awakened   Home Care Instructions Following Your Pain Procedure     Rufina,  It has been a pleasure to have you as our patient. To help you at home, you must follow these general discharge instructions. We will review these with you before you are discharged. It is our hope that you have a complete and uneventful recovery from our procedure. General Instructions:  What to Expect:  Bandages from your procedure today can be removed when you get home. Please avoid soaking and/or swimming for 24 hours. Showering is okay  It is normal to have increased pain symptoms and/or pain at injection site for up to 3-5 days after procedure, you can use heat or ice (20 minutes on 20 minutes off) for comfort. You may experience some temporary side effects which may include restlessness or insomnia, flushing of the face, or heart palpitations. Please contact the provider if these symptoms do not resolve within 3-4 days. Lightheadedness or nausea may occur and should resolve within 24 to 48 hours.   If you develop a headache after treatment, rest, drink fluids (with caffeine, if possible) and take mild over-the-counter pain medication. If the headache does not improve with the above treatment, contact the physician. Home Medications:  Resume all previously prescribed medication. Please avoid taking NSAIDs (Non-Steriodal Anti-Inflammatory Drugs) such as:  Ibuprofen ( Advil, Motrin) Aleve (Naproxen), Diclofenac, Meloxicam for 6 hours after procedure. If you are on Coumadin (Warfarin) or any other anti-coagulant (or \"blood thinning\") medication such as Plavix (Clopidogrel), Xarelto (Rivaroxaban), Eliquis (Apixaban), Effient (Prasugrel) etc., restart on the following day from the procedure unless otherwise directed by your provider. If you are a diabetic, please increase the frequency of your glucose monitoring after the procedure as steroids may cause a temporary (2-3 day) increase in your blood sugar. Contact your primary care physician if your blood sugar remains elevated as you may require some medication adjustment. Diet:  Resume your regular diet as tolerated. Activity: We recommend that you relax and rest during the rest of your procedure day. If you feel weakness in your arms or legs do not drive. Follow-up Appointment  Please schedule a follow-up visit within 3 to 4 weeks after your last procedure date. Question or Concerns:  Feel free to call our office with any questions or concerns at [phone] (option #2)    Patsy Denny  Thank you for coming to [facility] for your procedure. The nurses try very hard to make sure you receive the best care possible. Your trust in them as well as us is greatly appreciated.     Thanks so much,   Dr. Elspeth Hammans

## 2023-08-16 ENCOUNTER — TELEPHONE (OUTPATIENT)
Dept: PAIN CLINIC | Facility: CLINIC | Age: 53
End: 2023-08-16

## 2023-08-16 NOTE — TELEPHONE ENCOUNTER
Courtesy called placed to patient for post procedure follow up. Patient stated she is doing okay but is experiencing pain near her tailbone, primarily on the right side. Pt also states she would like to do physical therapy and is wondering if Martina Motley can place an order. Advised pt that we should schedule a f/u appt so that we can document injection response and address her other concerns. Pt scheduled f/u appt on 9/5/23. Pt verbalized understanding to call with any questions or concerns.       Procedure: CERVICAL FACET INJECTION RIGHT C4/5, C5/6, C6/7 with sedation   Date: 8/15/23  Follow up Visit Scheduled: 9/5/23

## 2023-08-18 ENCOUNTER — TELEPHONE (OUTPATIENT)
Dept: INTERNAL MEDICINE CLINIC | Facility: CLINIC | Age: 53
End: 2023-08-18

## 2023-08-18 DIAGNOSIS — J30.9 CHRONIC ALLERGIC RHINITIS: Primary | ICD-10-CM

## 2023-08-18 NOTE — TELEPHONE ENCOUNTER
Referral entered for Dr. Sumeet Perez, [address]  [place], [address] [place] [address]  [phone]

## 2023-08-18 NOTE — TELEPHONE ENCOUNTER
Pt requesting referral to an Allergist in [place]   Pt reports nasal spray and Montelukast is not working for her.      Please advise

## 2023-08-21 NOTE — TELEPHONE ENCOUNTER
Referral for Dr. John Uriostegui faxed to [phone]. Confirmation received. [facility] sent to pt.

## 2023-08-22 ENCOUNTER — HOSPITAL ENCOUNTER (OUTPATIENT)
Facility: HOSPITAL | Age: 53
Setting detail: HOSPITAL OUTPATIENT SURGERY
Discharge: HOME | End: 2023-08-22
Attending: ANESTHESIOLOGY | Admitting: ANESTHESIOLOGY
Payer: COMMERCIAL

## 2023-08-22 ENCOUNTER — APPOINTMENT (OUTPATIENT)
Dept: GENERAL RADIOLOGY | Facility: HOSPITAL | Age: 53
End: 2023-08-22
Attending: ANESTHESIOLOGY
Payer: COMMERCIAL

## 2023-08-22 VITALS
HEART RATE: 76 BPM | OXYGEN SATURATION: 100 % | SYSTOLIC BLOOD PRESSURE: 137 MMHG | RESPIRATION RATE: 16 BRPM | DIASTOLIC BLOOD PRESSURE: 98 MMHG | TEMPERATURE: 97 F

## 2023-08-22 PROCEDURE — 99152 MOD SED SAME PHYS/QHP 5/>YRS: CPT | Performed by: ANESTHESIOLOGY

## 2023-08-22 PROCEDURE — BR141ZZ FLUOROSCOPY OF CERVICAL FACET JOINT(S) USING LOW OSMOLAR CONTRAST: ICD-10-PCS | Performed by: ANESTHESIOLOGY

## 2023-08-22 PROCEDURE — 3E0T33Z INTRODUCTION OF ANTI-INFLAMMATORY INTO PERIPHERAL NERVES AND PLEXI, PERCUTANEOUS APPROACH: ICD-10-PCS | Performed by: ANESTHESIOLOGY

## 2023-08-22 PROCEDURE — 3E0T3BZ INTRODUCTION OF ANESTHETIC AGENT INTO PERIPHERAL NERVES AND PLEXI, PERCUTANEOUS APPROACH: ICD-10-PCS | Performed by: ANESTHESIOLOGY

## 2023-08-22 RX ORDER — DIPHENHYDRAMINE HYDROCHLORIDE 50 MG/ML
50 INJECTION INTRAMUSCULAR; INTRAVENOUS ONCE AS NEEDED
Status: DISCONTINUED | OUTPATIENT
Start: 2023-08-22 | End: 2023-08-22

## 2023-08-22 RX ORDER — ONDANSETRON 2 MG/ML
4 INJECTION INTRAMUSCULAR; INTRAVENOUS ONCE AS NEEDED
Status: DISCONTINUED | OUTPATIENT
Start: 2023-08-22 | End: 2023-08-22

## 2023-08-22 RX ORDER — SODIUM CHLORIDE, SODIUM LACTATE, POTASSIUM CHLORIDE, CALCIUM CHLORIDE 600; 310; 30; 20 MG/100ML; MG/100ML; MG/100ML; MG/100ML
100 INJECTION, SOLUTION INTRAVENOUS CONTINUOUS
Status: DISCONTINUED | OUTPATIENT
Start: 2023-08-22 | End: 2023-08-22

## 2023-08-22 RX ORDER — DEXAMETHASONE SODIUM PHOSPHATE 10 MG/ML
INJECTION, SOLUTION INTRAMUSCULAR; INTRAVENOUS
Status: DISCONTINUED | OUTPATIENT
Start: 2023-08-22 | End: 2023-08-22

## 2023-08-22 RX ORDER — LIDOCAINE HYDROCHLORIDE 10 MG/ML
INJECTION, SOLUTION EPIDURAL; INFILTRATION; INTRACAUDAL; PERINEURAL
Status: DISCONTINUED | OUTPATIENT
Start: 2023-08-22 | End: 2023-08-22

## 2023-08-22 RX ORDER — SODIUM CHLORIDE 9 MG/ML
INJECTION INTRAVENOUS
Status: DISCONTINUED | OUTPATIENT
Start: 2023-08-22 | End: 2023-08-22

## 2023-08-22 RX ORDER — MIDAZOLAM HYDROCHLORIDE 1 MG/ML
INJECTION INTRAMUSCULAR; INTRAVENOUS
Status: DISCONTINUED | OUTPATIENT
Start: 2023-08-22 | End: 2023-08-22

## 2023-08-22 NOTE — OPERATIVE REPORT
[facility]  Operative Report  2023     [facility] Patient Status:  Hospital Outpatient Surgery    2/10/1970 MRN [id number]   Location [id number] [place] [address] Attending Maddy Hernandez MD   Hosp Day # 0 PCP John Mcmanus MD     Indication: Joseph Horton is a 48year old female with cervical spondylosis    Preoperative Diagnosis:  Cervical spondylosis [M47.812]    Postoperative Diagnosis: Same as above. Procedure performed: CERVICAL FACET INJECTION left C4/5, C5/6 C6/7 with sedation    Anesthesia: Local and IV Sedation. EBL: Less than 1 ml. Procedure Description:  After reviewing the patient's history and performing a focused physical examination, the diagnosis was confirmed and contraindications such as infection and coagulopathy were ruled out. Following review of allergy, potential side effects and complications, including but not necessarily limited to infection, allergic reaction, local tissue breakdown, nerve injury, and paresis, the patient indicated they understood and agreed to proceed. After obtaining the informed consent, the patient was brought to the procedure room and monitored. Per my order and under my supervision, the patient was sedated with intermittent intravenous medications (as recorded). The vital signs were monitored and recorded by an experienced RN. The procedure started after the patient was adequately sedated. Sedation time was 7 minutes. In the prone position, following sterile prep and drape of the cervical region, the articular pillars of the corresponding facet joints were identified under fluoroscopy. The skin and subcutaneous tissue were anesthetized via 25-gauge 1-1/2 inch needle with approximately 1 mL of 1% lidocaine. Under fluoroscopy, posterolateral approach was used to position a 22-gauge, 3-1/2-inch spinal needle adjacent to the mid portion of the corresponding articular pillar at C4.   The needle position was confirmed in AP and lateral views. Following negative aspiration for CSF and blood, 0.5 mL 1% lidocaine and 3 mg of decadron were injected. Similar procedures were performed at C5 and C6 levels. The needles were removed with tips intact. The patient tolerated the procedure very well. No complications were encountered during or immediately after the procedure. The patient was observed until discharge criteria met. Discharge instructions were given and patient was released to a responsible adult. Complications: None. Follow up:   In clinic    Collette Griffins, MD

## 2023-08-22 NOTE — DISCHARGE INSTRUCTIONS
You have been given medication that makes you sleepy. This may have included both pain medication and sleeping medication. Most of the effects have worn off but you may still have some drowsiness for the next 6 to 8 hours. Home Care  Follow these guidelines when you get home:    --Don't drink any alcohol for the next 24 hours. --Don't drive, operate dangerous machinery, make important business or personal    decisions, or sign legal documents during the next 24 hours. Follow Up Care  Follow up with your healthcare provider if you are not alert and back to your usual level of activity within 12 hours    When to seek medical advice  Call your healthcare provider right away if any of these occur:    --Drowsiness that gets worse  --Weakness or dizziness that gets worse  --Repeated vomiting  --You can not be awakened   Home Care Instructions Following Your Pain Procedure     Rufina,  It has been a pleasure to have you as our patient. To help you at home, you must follow these general discharge instructions. We will review these with you before you are discharged. It is our hope that you have a complete and uneventful recovery from our procedure. General Instructions:  What to Expect:  Bandages from your procedure today can be removed when you get home. Please avoid soaking and/or swimming for 24 hours. Showering is okay  It is normal to have increased pain symptoms and/or pain at injection site for up to 3-5 days after procedure, you can use heat or ice (20 minutes on 20 minutes off) for comfort. You may experience some temporary side effects which may include restlessness or insomnia, flushing of the face, or heart palpitations. Please contact the provider if these symptoms do not resolve within 3-4 days. Lightheadedness or nausea may occur and should resolve within 24 to 48 hours.   If you develop a headache after treatment, rest, drink fluids (with caffeine, if possible) and take mild over-the-counter pain medication. If the headache does not improve with the above treatment, contact the physician. Home Medications:  Resume all previously prescribed medication. Please avoid taking NSAIDs (Non-Steriodal Anti-Inflammatory Drugs) such as:  Ibuprofen ( Advil, Motrin) Aleve (Naproxen), Diclofenac, Meloxicam for 6 hours after procedure. If you are on Coumadin (Warfarin) or any other anti-coagulant (or \"blood thinning\") medication such as Plavix (Clopidogrel), Xarelto (Rivaroxaban), Eliquis (Apixaban), Effient (Prasugrel) etc., restart on the following day from the procedure unless otherwise directed by your provider. If you are a diabetic, please increase the frequency of your glucose monitoring after the procedure as steroids may cause a temporary (2-3 day) increase in your blood sugar. Contact your primary care physician if your blood sugar remains elevated as you may require some medication adjustment. Diet:  Resume your regular diet as tolerated. Activity: We recommend that you relax and rest during the rest of your procedure day. If you feel weakness in your arms or legs do not drive. Follow-up Appointment  Please schedule a follow-up visit within 3 to 4 weeks after your last procedure date. Question or Concerns:  Feel free to call our office with any questions or concerns at [phone] (option #2)    Sony Perez  Thank you for coming to [facility] for your procedure. The nurses try very hard to make sure you receive the best care possible. Your trust in them as well as us is greatly appreciated.     Thanks so much,   Dr. Jeanine Figueroa

## 2023-08-22 NOTE — H&P
History & Physical Examination    Patient Name: Obed Gaytan  MRN: [id number]  [facility]: [id number]  YOB: 1970    Pre-Operative Diagnosis:  Cervical spondylosis [Q44.384]    Present Illness: Patient with cervical spondylosis    montelukast 10 MG Oral Tab, Take 1 tablet (10 mg total) by mouth nightly., Disp: 90 tablet, Rfl: 1  ipratropium 0.03 % Nasal Solution, 1 spray by Nasal route daily. , Disp: 30 mL, Rfl: 2  SUMAtriptan 50 MG Oral Tab, Take 1 tablet (50 mg total) by mouth every 2 (two) hours as needed. NO MORE THAN 4 TABLETS IN 24 HOURS., Disp: 12 tablet, Rfl: 3  atorvastatin 10 MG Oral Tab, Take 1 tablet (10 mg total) by mouth nightly., Disp: 90 tablet, Rfl: 1  NIFEdipine ER 30 MG Oral Tablet 24 Hr, Take 1 tablet (30 mg total) by mouth daily. , Disp: 90 tablet, Rfl: 1  ARIPIPRAZOLE 5 MG Oral Tab, Take 1 tablet by mouth every morning., Disp: 30 tablet, Rfl: 0  Amphetamine-Dextroamphet ER (ADDERALL XR) 20 MG Oral Capsule SR 24 Hr, Take 2 capsules (40 mg total) by mouth every morning., Disp: 60 capsule, Rfl: 0  FLUoxetine 20 MG Oral Cap, Take 1 capsule (20 mg total) by mouth daily. , Disp: 90 capsule, Rfl: 0  prazosin 1 MG Oral Cap, Take 2 capsules (2 mg total) by mouth nightly., Disp: 60 capsule, Rfl: 2  ergocalciferol 1.25 MG (44477 UT) Oral Cap, Take 1 capsule (50,000 Units total) by mouth every 7 days. , Disp: , Rfl:   FOLIC ACID 1 MG Oral Tab, TAKE 1 TABLET BY MOUTH DAILY, Disp: 30 tablet, Rfl: 0  propranolol 10 MG Oral Tab, Take 1-2 tablets (10-20 mg total) by mouth 3 (three) times daily as needed (for anxiety/panic). , Disp: 90 tablet, Rfl: 0  traZODone 50 MG Oral Tab, Take 1-2 tablets ( mg total) by mouth nightly. insomnia, Disp: 180 tablet, Rfl: 0  hydroCHLOROthiazide 25 MG Oral Tab, Take 1 tablet (25 mg total) by mouth daily. , Disp: 90 tablet, Rfl: 3  SUMAtriptan Succinate 100 MG Oral Tab, , Disp: , Rfl:   TIZANIDINE 4 MG Oral Tab, Take 1 tablet by mouth every 8 hours as needed. , Disp: 30 tablet, Rfl: 2  clonazePAM 0.5 MG Oral Tablet Dispersible, Take 1 tablet (0.5 mg total) by mouth 4 (four) times daily as needed (anxiety and/or insomnia). , Disp: 90 tablet, Rfl: 2  topiramate 100 MG Oral Tab, Take 1 tablet (100 mg total) by mouth 2 (two) times daily. , Disp: 60 tablet, Rfl: 5  azelastine 0.1 % Nasal Solution, 1 spray by Nasal route 2 (two) times daily. , Disp: 30 mL, Rfl: 3  selenium sulfide 2.5 % External Lotion, Apply 1 Application. topically daily as needed (tinea versicolor). , Disp: 118 mL, Rfl: 1  ketoconazole 2 % External Cream, Apply under breast folds twice daily as needed, Disp: 60 g, Rfl: 1  levocetirizine 5 MG Oral Tab, Take 1 tablet (5 mg total) by mouth every evening., Disp: , Rfl:       lactated ringers infusion, 100 mL/hr, Intravenous, Continuous  ondansetron (Zofran) 4 MG/2ML injection 4 mg, 4 mg, Intravenous, Once PRN        Allergies:   Midrin [Apap-Isomet*    HIVES  Other                   RASH, ITCHING    Comment:Deodorant  Mold                      Pollen                    Trees, Box Elder          Adhesive Tape           RASH  Band-Aid Anti-Itch      RASH    Comment:Band aid glue    Past Medical History:   Diagnosis Date    ADHD     Alcohol use disorder, moderate, dependence (HCC)     Anemia     Anxiety     Chronic rhinitis     High blood pressure     Migraines     Multinodular thyroid 09/16/2016    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 Aure Estrada)    COLONOSCOPY      CYST ASPIRATION LEFT      CYST ASPIRATION RIGHT      HYSTERECTOMY  09/11/2012    for fibriods- supracervical- still has cervix and ovaries    OTHER Right     bonion removal OTHER SURGICAL HISTORY  2009    colposcopy    OTHER SURGICAL HISTORY  2015    abdominoplasty    OTHER SURGICAL HISTORY Right 2/23/2016    bunion surgery    OTHER SURGICAL HISTORY Bilateral 10/11/16    Breast reduction surgery - Dr. Cameron Yancey LEFT      10/2016    REDUCTION RIGHT  10/2016    TUBAL LIGATION  2002     Family History   Problem Relation Age of Onset    Hypertension Father     ADHD Father     Heart Disorder Mother 32        cardiac arrest -- possibly related to medication interaction / possible overdose    Alcohol and Other Disorders Associated Mother     Substance Abuse Mother     Bipolar Disorder Mother     ADHD Daughter     Bipolar Disorder Son     ADHD Son     ADHD Son     Alcohol and Other Disorders Associated Maternal Grandmother     Other (Other) Maternal Grandmother         alcoholic cirrhosis    Hypertension Sister     Other (Other) Sister         lymphedema    Homicide History Cousin     Cancer Neg     Stroke Neg      Social History    Tobacco Use      Smoking status: Never      Smokeless tobacco: Never    Alcohol use: Not Currently      Comment: last drink 2/2023 daily drinking before 2/2023 \"many many years\"      SYSTEM Check if Review is Normal Check if Physical Exam is Normal If not normal, please explain:   HEENT [x ] [x ]    NECK & BACK [x ] [x ]    HEART [x ] [x ]    LUNGS [x ] [x ]    ABDOMEN [x ] [x ]    UROGENITAL [x ] [x ]    EXTREMITIES [x ] [x ]    OTHER        [ x ] I have discussed the risks and benefits and alternatives with the patient/family. They understand and agree to proceed with plan of care. [ x ] I have reviewed the History and Physical done within the last 30 days. Any changes noted above.     Katherine Jernigan MD

## 2023-08-23 ENCOUNTER — TELEPHONE (OUTPATIENT)
Dept: PAIN CLINIC | Facility: CLINIC | Age: 53
End: 2023-08-23

## 2023-08-23 NOTE — TELEPHONE ENCOUNTER
Courtesy called placed to patient for post procedure follow up. Patient stated she is doing good, she did have a headache yesterday but is doing well. Pt stated she would like to do PT, advised that can be discussed with Faisal Yates at f/u appt. Pt verbalized understanding to call with any questions or concerns.       Procedure: CERVICAL FACET INJECTION left C4/5, C5/6 C6/7 with sedation   Date: 8/22/23  Follow up Visit Scheduled: 9/5/23

## 2023-09-05 ENCOUNTER — OFFICE VISIT (OUTPATIENT)
Dept: PAIN CLINIC | Facility: CLINIC | Age: 53
End: 2023-09-05
Payer: COMMERCIAL

## 2023-09-05 VITALS — HEART RATE: 123 BPM | SYSTOLIC BLOOD PRESSURE: 118 MMHG | OXYGEN SATURATION: 99 % | DIASTOLIC BLOOD PRESSURE: 78 MMHG

## 2023-09-05 DIAGNOSIS — M47.812 CERVICAL SPONDYLOSIS: Primary | ICD-10-CM

## 2023-09-05 NOTE — PROGRESS NOTES
Last procedure: CERVICAL FACET INJECTION RIGHT C4/5, C5/6, C6/7 with sedation   Date: 8/15/23  Percentage of relief obtained: 75%  Duration of relief: current    Current Pain Score: 3/10       Last procedure: CERVICAL FACET INJECTION left C4/5, C5/6 C6/7 with sedation   Date: 8/22/23  Percentage of relief obtained: 75%  Duration of relief: current    Current Pain Score: 3/10

## 2023-09-13 ENCOUNTER — LAB ENCOUNTER (OUTPATIENT)
Dept: LAB | Age: 53
End: 2023-09-13
Attending: ALLERGY & IMMUNOLOGY
Payer: COMMERCIAL

## 2023-09-13 DIAGNOSIS — J30.89 NON-SEASONAL ALLERGIC RHINITIS: Primary | ICD-10-CM

## 2023-09-13 PROCEDURE — 86003 ALLG SPEC IGE CRUDE XTRC EA: CPT

## 2023-09-13 PROCEDURE — 82785 ASSAY OF IGE: CPT

## 2023-09-13 PROCEDURE — 36415 COLL VENOUS BLD VENIPUNCTURE: CPT

## 2023-09-14 LAB

## 2023-09-18 ENCOUNTER — TELEPHONE (OUTPATIENT)
Dept: INTERNAL MEDICINE CLINIC | Facility: CLINIC | Age: 53
End: 2023-09-18

## 2023-09-18 DIAGNOSIS — J38.3 VOCAL CORD DYSFUNCTION: Primary | ICD-10-CM

## 2023-09-21 ENCOUNTER — TELEPHONE (OUTPATIENT)
Dept: INTERNAL MEDICINE CLINIC | Facility: CLINIC | Age: 53
End: 2023-09-21

## 2023-09-21 DIAGNOSIS — G47.33 OSA ON CPAP: Primary | ICD-10-CM

## 2023-09-21 NOTE — TELEPHONE ENCOUNTER
Patient states that completed initial sleep study previously. Study in [facility] from 2022. She has been diagnosed with FLORENTINO. Needs to complete titration for CPAP however, she's unsure if she needs to restart with initial sleep study again since it has been so long. Requesting referral to be placed.

## 2023-09-21 NOTE — TELEPHONE ENCOUNTER
I think hopefully she can just do the CPAP titration study since her sleep study was about a year ago. CPAP titration order entered. She can call sleep center to schedule. If they say she needs to start over with diagnostic sleep study I can enter the order then. [facility] should reach out to her once sleep study is done.

## 2023-09-22 NOTE — TELEPHONE ENCOUNTER
Referral for CPAP titration study is authorized. Faxed referral docs to Jacoby Dominguez at fax #[phone] and received confirmation page.

## 2023-09-26 PROBLEM — K64.8 INTERNAL HEMORRHOIDS WITHOUT COMPLICATION: Status: ACTIVE | Noted: 2023-09-26

## 2023-09-30 ENCOUNTER — OFFICE VISIT (OUTPATIENT)
Dept: SLEEP CENTER | Age: 53
End: 2023-09-30
Attending: Other
Payer: COMMERCIAL

## 2023-09-30 DIAGNOSIS — G47.33 OSA ON CPAP: ICD-10-CM

## 2023-09-30 PROCEDURE — 95811 POLYSOM 6/>YRS CPAP 4/> PARM: CPT

## 2023-10-02 DIAGNOSIS — G43.009 MIGRAINE WITHOUT AURA AND WITHOUT STATUS MIGRAINOSUS, NOT INTRACTABLE: Primary | ICD-10-CM

## 2023-10-02 NOTE — TELEPHONE ENCOUNTER
Medication: TOPIRAMATE 100 MG Oral Tab      Date of last refill: 04/06/2023 (#60/5)  [place]     Last office visit: 04/06/2023  Due back to clinic per last office note:  Around 07/06/2023  Date next office visit scheduled:    Future Appointments   Date Time Provider Kellen Elina   10/3/2023  6:00 PM Sona JonesKun l[address]   10/6/2023  9:30 [facility]   10/10/2023  7:30 AM Lilliam Quintanilla, DO [address] [place] [address] SUB GI   10/12/2023  4:00 PM Lilliam Quintanilla, [address] SUB GI   10/13/2023 10:40 AM CASEY Mata   10/20/2023  9:00 AM PF US RM2 PF US [place]   10/24/2023  7:45 AM Olga Lidia Castillo, [address] SUB GI   4/24/2024 11:20 AM PF JEAN-CLAUDE RM2 PF MAMMO Herminie           Last OV note recommendation:    ASSESSMENT/PLAN:      (G43.009) Migraine without aura and without status migrainosus, not intractable  (primary encounter diagnosis)  Plan: SUMAtriptan Succinate 100 MG Oral Tab            (R51.9) Mixed headache        Headache worsen since last visit, getting more frequent migraine headache  MRI brain done previously was negative for acute abnormality      Increase dose of Topamax slowly to 100 mg BID as tolerated  Take Sumatriptan as needed- limit to 2-3 times per week     Continue tizanidine as needed for neck muscle stiffness/tension type headaches        Maintain a headache diary and note triggers     Follow up in about 3- 6 months  See orders and medications filed with this encounter. The patient indicates understanding of these issues and agrees with the plan.

## 2023-10-04 RX ORDER — TOPIRAMATE 100 MG/1
100 TABLET, FILM COATED ORAL 2 TIMES DAILY
Qty: 60 TABLET | Refills: 2 | Status: SHIPPED | OUTPATIENT
Start: 2023-10-04

## 2023-10-06 ENCOUNTER — SLEEP STUDY (OUTPATIENT)
Facility: CLINIC | Age: 53
End: 2023-10-06
Payer: COMMERCIAL

## 2023-10-06 DIAGNOSIS — G47.33 OBSTRUCTIVE SLEEP APNEA SYNDROME: Primary | ICD-10-CM

## 2023-10-11 ENCOUNTER — TELEPHONE (OUTPATIENT)
Facility: CLINIC | Age: 53
End: 2023-10-11

## 2023-10-11 ENCOUNTER — TELEPHONE (OUTPATIENT)
Dept: INTERNAL MEDICINE CLINIC | Facility: CLINIC | Age: 53
End: 2023-10-11

## 2023-10-11 DIAGNOSIS — G47.33 OBSTRUCTIVE SLEEP APNEA: Primary | ICD-10-CM

## 2023-10-11 DIAGNOSIS — M62.89 PELVIC FLOOR DYSFUNCTION: Primary | ICD-10-CM

## 2023-10-11 NOTE — TELEPHONE ENCOUNTER
Patient is requesting a referral for physical therapy with      [facility]  Physical therapy-Edward  Dx. pelvic floor dysfunction

## 2023-10-11 NOTE — TELEPHONE ENCOUNTER
Miguel Doctor,    Your patient is coming to see the sleep specialists of [facility] regarding their FLORENTINO, and their HMO insurance requires a referral for the office consultation please. Multiple visits to \"Sleep Disorders\" or the scheduled physician would also be helpful.    Thank you!

## 2023-10-11 NOTE — TELEPHONE ENCOUNTER
Manometry results from Dr Willene Phalen below. Referral pending if appropriate. TY! Ilan Reed MD  10/10/2023 4:06 PM  Dear Jonathan Zuluaga hope that this letter finds you well. The results of your recent anorectal manometry were consistent with a disorder called pelvic floor dyssynergia (also known as pelvic floor dysfunction). This is a condition where the muscles of your pelvic floor are not coordinating well to facilitate a bowel movement. The sensation of your rectal lining also appeared increased. This can be helped with biofeedback or physical therapy directed toward coordinating these muscles. You can find more information regarding this condition at the following website:    [email or web address]    Please see below our recommended physical therapists. You should confirm coverage for either of these providers through your insurance. Once your appointment is confirmed, please inform us through [address] [place] [address] [address] or phone, and we will forward your manometry results to the provider. 1. Jaison Barclay at [facility]     [email or web address]    [address], [address] [facility], [address] [place] [address]    [phone] (scheduling)-Please give name of referring MD-Dr. Willene Phalen and request [place] location only       [address] [place] [address] [facility]-Kimmie Goins PT      [facility]      3. [facility]- Hersey Marcola    [address]    4. [address], [address]  Fax: [phone]    Please call the office or message via [employer] if you have further questions or if I can be of further assistance.     Abrahan Chase MD

## 2023-10-13 ENCOUNTER — TELEPHONE (OUTPATIENT)
Dept: PHYSICAL THERAPY | Facility: HOSPITAL | Age: 53
End: 2023-10-13

## 2023-10-16 ENCOUNTER — TELEPHONE (OUTPATIENT)
Dept: INTERNAL MEDICINE CLINIC | Facility: CLINIC | Age: 53
End: 2023-10-16

## 2023-10-16 ENCOUNTER — OFFICE VISIT (OUTPATIENT)
Dept: SPEECH THERAPY | Facility: HOSPITAL | Age: 53
End: 2023-10-16
Attending: INTERNAL MEDICINE
Payer: COMMERCIAL

## 2023-10-16 DIAGNOSIS — J38.3 VOCAL CORD DYSFUNCTION: Primary | ICD-10-CM

## 2023-10-16 PROCEDURE — 92524 BEHAVRAL QUALIT ANALYS VOICE: CPT
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
## 2023-10-16 NOTE — TELEPHONE ENCOUNTER
Patient stated she needs referral for Laryngologist.  Patient saw JASON Pabon and referred patient to see Laryngologist.   Patient is requesting anyone other than Fadi Arita as she had a poor experience with this physician. Please advise.

## 2023-10-16 NOTE — TELEPHONE ENCOUNTER
RP- pended referral to [facility] for your review/approval as appropriate    FYI-patient seen speech therapy 10/16 with following recommendations:   PLAN  Recommendations:   1) Pt will be seen 3x for voice therapy over the duration of 2 months, at which time a treatment plan update will be submitted and further recommendations will be made. 2) Therapy will target easy breathing strategies, methods for preventing/remediating a VCD/chronic cough attack, and laryngeal relaxation strategies. A home exercise program will also be provided in order to improve carry-over and generalization of goals to the home environment. 3)  F/u with ENT/pulmonology/Asthma and Allergy MD following completion of VCD therapy.

## 2023-10-20 ENCOUNTER — HOSPITAL ENCOUNTER (OUTPATIENT)
Dept: ULTRASOUND IMAGING | Age: 53
Discharge: HOME | End: 2023-10-20
Payer: COMMERCIAL

## 2023-10-20 DIAGNOSIS — R92.30 DENSE BREAST TISSUE: ICD-10-CM

## 2023-10-20 DIAGNOSIS — N60.02 BILATERAL BREAST CYSTS: Primary | ICD-10-CM

## 2023-10-20 DIAGNOSIS — N60.01 BILATERAL BREAST CYSTS: Primary | ICD-10-CM

## 2023-10-20 PROCEDURE — 76641 ULTRASOUND BREAST COMPLETE: CPT

## 2023-10-23 ENCOUNTER — OFFICE VISIT (OUTPATIENT)
Dept: SPEECH THERAPY | Facility: HOSPITAL | Age: 53
End: 2023-10-23
Attending: INTERNAL MEDICINE
Payer: COMMERCIAL

## 2023-10-23 ENCOUNTER — OFFICE VISIT (OUTPATIENT)
Facility: LOCATION | Age: 53
End: 2023-10-23
Payer: COMMERCIAL

## 2023-10-23 DIAGNOSIS — R05.9 COUGH IN ADULT PATIENT: Primary | ICD-10-CM

## 2023-10-23 DIAGNOSIS — J32.8 OTHER CHRONIC SINUSITIS: ICD-10-CM

## 2023-10-23 DIAGNOSIS — R09.82 POST-NASAL DRAINAGE: ICD-10-CM

## 2023-10-23 PROCEDURE — 92507 TX SP LANG VOICE COMM INDIV: CPT

## 2023-10-23 RX ORDER — AMITRIPTYLINE HYDROCHLORIDE 25 MG/1
25 TABLET, FILM COATED ORAL NIGHTLY
Qty: 90 TABLET | Refills: 0 | Status: SHIPPED | OUTPATIENT
Start: 2023-10-23

## 2023-10-23 NOTE — PROGRESS NOTES
Diagnosis:   Vocal cord dysfunction [J38.3]         Referring Provider: Patricio Allan  Date of Evaluation:   10/16/2023    Precautions:  None Next MD visit:   none scheduled  Date of Surgery: n/a   Insurance Primary/Secondary: [employer] [place] HMO / N/A       # Visits on POC: 3   Total Timed Treatment: 40 min  Date POC Expires: 1/14/2024  Total Treatment time: 40 min  Charges: [id number]   Treatment Number: 2    Subjective: Patient arrived on time to session. Patient participated actively in therapeutic tasks. Had ENT appointment on this date with Dr. Kelsey Tejada. Prescribed Elavil 25 mg for VCD. Flexible Laryngoscopy Findings: \"There is some thick postnasal drainage base of tongue epiglottis and true vocal cords are otherwise normal with normal mobility. \"    Pain: No pain reported on this date. Patient not being seen for pain. Objective:  Goals:    1)  Pt will independently demonstrate easy breathing strategies and methods for preventing/remediating a VCD/chronic cough attack in 8/10 trials at rest.  Progress: 8/10 trials at rest.     2)  Pt will independently demonstrate easy breathing strategies during mild to moderate exertion in 8/10 trials. Progress: Patient reports use at home    3)  Pt will report improved breathing and decreased coughing across daily tasks for the duration of 1 month in order to improve his/her ability to breathe efficiently and fully participate in all daily activities. Progress: Goal in progress    HEP: Use of rescue breathing and diaphragmatic breathing at rest  Education: Vocal anatomy and physiology and impact of respiration on phonation    Assessment: Patient presents with vocal cord dysfunction characterized by difficulty inhaling, laryngeal tightness/tension during episodes of SOB, improved breathing when laryngeal tension decreases, quickly resolving breathing symptoms following episodes, poor response to asthma medication, increased difficulty breathing in stressful situations.  On this date, patient demonstrated effective use of VCD recovery breathing techniques (i.e., sniff-sniff, breathe; sniff, breathe). Patient reported success with use of recovery breathing especially in the morning when VCD attacks occur consistently. Patient benefited from verbal feedback of performance to improve form. Plan: Continue speech-language therapy targeting VCD and recovery breathing.

## 2023-10-23 NOTE — PROGRESS NOTES
Odalis Lutz is a 48year old female. Patient presents with:  Voice Problem    HPI:   She has a history of chronic cough. She also has a history of allergies. She has seen the allergist and work-up has been performed. Work-up is showing that her allergies have lessened. She has tried numerous medication including Benadryl Xyzal and Symbicort without relief. She has been ruled out asthma. She is on a PPI. She denies reflux at this time. She does get chronic sinus problems and is concerned about chronic sinusitis. She takes Astelin nasal spray. REVIEW OF SYSTEMS:   GENERAL HEALTH: feels well otherwise  GENERAL : denies fever, chills, sweats, weight loss, weight gain  SKIN: denies any unusual skin lesions or rashes  RESPIRATORY: denies shortness of breath with exertion  NEURO: denies headaches    EXAM:   [facility] 08/31/2012     System Findings Details   Constitutional  Overall appearance - Normal.   Psychiatric  Orientation - Oriented to time, place, person & situation. Appropriate mood and affect. Head/Face  Facial features -- Normal. Skull - Normal.   Eyes  Pupils equal ,round ,react to light and accomidate   Ears, Nose, Throat, Neck  Ears clear nose congestion oropharynx clear neck no masses   Neurological  Memory - Normal. Cranial nerves - Cranial nerves II through XII grossly intact. Lymph Detail  Submental. Submandibular. Anterior cervical. Posterior cervical. Supraclavicular. Flexible Laryngoscopy Procedure Note (55941)    Due to inability for adequate examination of the larynx and need for magnification to perform the examination, endoscopy was performed. Risks and benefits were discussed with patient/family and they have given verbal consent to proceed.     Pre-operative Diagnosis: Cough in adult patient  (primary encounter diagnosis)  Post-nasal drainage  Other chronic sinusitis    Post-operative Diagnosis: Same    Procedure: Diagnostic flexible fiberoptic laryngoscopy    Anesthesia: topical lidocaine    Surgeon: Kendell Juárez MD    EBL: 0cc    Procedure Detail & Findings: There is some thick postnasal drainage base of tongue epiglottis and true vocal cords are otherwise normal with normal mobility. Condition: Stable    Complications: Patient tolerated the procedure well with no immediate complications. Elizabeth Bhat MD  ASSESSMENT AND PLAN:   1. Cough in adult patient  I agree that vocal cord dysfunction is playing a role. She will continue with speech therapy. I will add Elavil at 25 mg for the vocal cord dysfunction. She was on this in the past.  - [facility] (CPT=70486); Future    2. Post-nasal drainage      3. Other chronic sinusitis  Chronic sinusitis could be playing a role. She will undergo a dedicated CT of the sinus. At the very least she will see me in 3 months.  - [address] [facility] (CPT=70486); Future      The patient indicates understanding of these issues and agrees to the plan. Return in about 3 months (around 1/23/2024).     Kendell Juárez MD  10/23/2023  12:01 PM

## 2023-10-24 ENCOUNTER — TELEMEDICINE (OUTPATIENT)
Facility: CLINIC | Age: 53
End: 2023-10-24

## 2023-10-24 ENCOUNTER — TELEPHONE (OUTPATIENT)
Facility: CLINIC | Age: 53
End: 2023-10-24

## 2023-10-24 DIAGNOSIS — G47.33 OSA (OBSTRUCTIVE SLEEP APNEA): Primary | ICD-10-CM

## 2023-10-24 DIAGNOSIS — G47.10 HYPERSOMNIA: ICD-10-CM

## 2023-10-24 DIAGNOSIS — G47.33 OBSTRUCTIVE SLEEP APNEA: Primary | ICD-10-CM

## 2023-10-24 DIAGNOSIS — I10 PRIMARY HYPERTENSION: Chronic | ICD-10-CM

## 2023-10-24 PROBLEM — K59.00 CONSTIPATION: Status: ACTIVE | Noted: 2023-10-24

## 2023-10-24 PROBLEM — R14.1 ABDOMINAL GAS PAIN: Status: ACTIVE | Noted: 2023-10-24

## 2023-10-24 PROCEDURE — 99204 OFFICE O/P NEW MOD 45 MIN: CPT | Performed by: OTHER
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
## 2023-10-25 ENCOUNTER — OFFICE VISIT (OUTPATIENT)
Dept: PHYSICAL THERAPY | Age: 53
End: 2023-10-25
Attending: PHYSICIAN ASSISTANT

## 2023-10-25 DIAGNOSIS — M47.812 CERVICAL SPONDYLOSIS: Primary | ICD-10-CM

## 2023-10-25 PROCEDURE — 97161 PT EVAL LOW COMPLEX 20 MIN: CPT | Performed by: PHYSICAL THERAPIST

## 2023-10-25 PROCEDURE — 97110 THERAPEUTIC EXERCISES: CPT | Performed by: PHYSICAL THERAPIST

## 2023-10-30 ENCOUNTER — OFFICE VISIT (OUTPATIENT)
Dept: SPEECH THERAPY | Facility: HOSPITAL | Age: 53
End: 2023-10-30
Attending: INTERNAL MEDICINE

## 2023-10-30 ENCOUNTER — TELEPHONE (OUTPATIENT)
Dept: PHYSICAL THERAPY | Facility: HOSPITAL | Age: 53
End: 2023-10-30

## 2023-10-30 PROCEDURE — 92507 TX SP LANG VOICE COMM INDIV: CPT

## 2023-10-30 NOTE — PROGRESS NOTES
Diagnosis:   Vocal cord dysfunction [J38.3]         Referring Provider: Amaury Patterson  Date of Evaluation:   10/16/2023    Precautions:  None Next MD visit:   none scheduled  Date of Surgery: n/a   Insurance Primary/Secondary: [employer] [place] HMO / N/A       # Visits on POC: 3   Total Timed Treatment: 40 min  Date POC Expires: 1/14/2024  Total Treatment time: 40 min  Charges: [id number]   Treatment Number: 3    Subjective: Patient arrived on time to session. Patient participated actively in therapeutic tasks. Pain: No pain reported on this date. Patient not being seen for pain. Objective:  Goals:    1)  Pt will independently demonstrate easy breathing strategies and methods for preventing/remediating a VCD/chronic cough attack in 8/10 trials at rest.  Progress: 9/10 trials at rest.     2)  Pt will independently demonstrate easy breathing strategies during mild to moderate exertion in 8/10 trials. Progress: Patient reports use at home    3)  Pt will report improved breathing and decreased coughing across daily tasks for the duration of 1 month in order to improve his/her ability to breathe efficiently and fully participate in all daily activities. Progress: Goal in progress    HEP: Use of rescue breathing and diaphragmatic breathing at rest  Education: Vocal anatomy and physiology and impact of respiration on phonation    Assessment: Patient presents with vocal cord dysfunction characterized by difficulty inhaling, laryngeal tightness/tension during episodes of SOB, improved breathing when laryngeal tension decreases, quickly resolving breathing symptoms following episodes, poor response to asthma medication, increased difficulty breathing in stressful situations. On this date, patient demonstrated effective use of VCD recovery breathing techniques (i.e., sniff, breathe). Patient demonstrated improved performance and increased independence.  Patient exhibits readiness to continue use of VCD recovery breathing and diaphragmatic breathing independently within the home with f/u visit in 3 weeks. Plan: Continue speech-language therapy targeting VCD and recovery breathing. Patient to have f/u visit in 3 weeks.

## 2023-11-01 ENCOUNTER — OFFICE VISIT (OUTPATIENT)
Dept: ORTHOPEDICS CLINIC | Facility: CLINIC | Age: 53
End: 2023-11-01
Payer: COMMERCIAL

## 2023-11-01 ENCOUNTER — OFFICE VISIT (OUTPATIENT)
Dept: PHYSICAL THERAPY | Age: 53
End: 2023-11-01
Attending: INTERNAL MEDICINE
Payer: COMMERCIAL

## 2023-11-01 ENCOUNTER — TELEPHONE (OUTPATIENT)
Dept: PHYSICAL THERAPY | Facility: HOSPITAL | Age: 53
End: 2023-11-01

## 2023-11-01 VITALS — BODY MASS INDEX: 21.53 KG/M2 | HEIGHT: 66 IN | WEIGHT: 134 LBS

## 2023-11-01 DIAGNOSIS — M20.42 HAMMER TOES OF BOTH FEET: ICD-10-CM

## 2023-11-01 DIAGNOSIS — M21.619 BUNION: ICD-10-CM

## 2023-11-01 DIAGNOSIS — R25.2 FOOT CRAMPS: ICD-10-CM

## 2023-11-01 DIAGNOSIS — M20.41 HAMMER TOES OF BOTH FEET: ICD-10-CM

## 2023-11-01 DIAGNOSIS — L84 CORNS OF MULTIPLE TOES: ICD-10-CM

## 2023-11-01 DIAGNOSIS — M79.673 FOOT ARCH PAIN, UNSPECIFIED LATERALITY: Primary | ICD-10-CM

## 2023-11-01 DIAGNOSIS — M62.89 PELVIC FLOOR DYSFUNCTION: Primary | ICD-10-CM

## 2023-11-01 PROCEDURE — 3008F BODY MASS INDEX DOCD: CPT | Performed by: PODIATRIST

## 2023-11-01 PROCEDURE — 99213 OFFICE O/P EST LOW 20 MIN: CPT | Performed by: PODIATRIST

## 2023-11-01 NOTE — PROGRESS NOTES
[facility] Progress Note    Subjective:   Gorge Galindo is here for follow up  Had one session PT  Mainly has been working on back PT    Has had corns for years - since her youth  Getting a lot of cramping and discomfort with her feet        Objective:     Good ROM mpj's, midfoot joints and ankle and subtalar joint. No visible swelling today. No interdigital pain    Mild pain left foot - first met head mild exostosis first MP joint left foot          Imaging: Review of x-rays show mild arthritic first MP joint of the left foot        Assessment/Plan:     Diagnoses and all orders for this visit:    Foot arch pain, unspecified laterality    Foot cramps    Hammer toes of both feet    Corns of multiple toes    Bunion    At the bunion on the left foot and its pretty minor severity but a lot of discomfort. She may benefit from a simple exostectomy because pain is so localized. We could consider that but I would like her to continue with physical therapy as she has only had 1 session. I am interested to see if the intrinsic muscle strengthening helps with the cramping and the focus on the calf as well    PT-follow up after PT    Consider cheilectomy at some point first mpj            Merly Suh. Hyacinth Looney DPM  [facility]    [email or web address] speech recognition software was used to prepare this note. If a word or phrase is confusing, it is likely do to a failure of recognition. Please contact me with any questions or clarifications.

## 2023-11-01 NOTE — PROGRESS NOTES
Upon pt's arrival to session, PT quickly identified pt was not appropriate to be evaluated by current PT as originally scheduled, & as such, pt will be a \"no charge\"/ treatment not appropriate with full evaluation to be completed by either Ryan Castro PT or different pelvic PT with adequate bowel dysfunction training. Please see below for additional details. Pt was originally referred to pelvic PT with names of several therapists including Ryan Castro PT at [facility] after results of anorectal manometry (performed due to severe constipation & infrequent BM with difficulty emptying & clenching) demonstrated benefit from pelvic PT for coordination of muscles for BM. Pt reports hx of UI & mesh sling from Dr. Trenton Silva last year, with significant reduction in leakage, though difficulty emptying bladder well. However, pt reports her primary concern (reason for attending pelvic PT) is with her bowel dysfunction. Due to this, PT determined it was not indicated to proceed with full pelvic PT evaluation as pt needs to be evaluated by pelvic PT who has underwent the full training in treatment of bowel dysfunction (which current PT has not) in order to provide pt with the appropriate treatment necessary to treat her concerns. complains of pain/discomfort

## 2023-11-02 ENCOUNTER — OFFICE VISIT (OUTPATIENT)
Dept: PHYSICAL THERAPY | Age: 53
End: 2023-11-02
Attending: PHYSICIAN ASSISTANT
Payer: COMMERCIAL

## 2023-11-02 PROCEDURE — 97110 THERAPEUTIC EXERCISES: CPT | Performed by: PHYSICAL THERAPIST

## 2023-11-02 NOTE — PROGRESS NOTES
Dx:  Cervical spondylosis            Authorized # of Visits:  5  Fall Risk: standard         Precautions: n/a             Subjective:   Bilateral upper back and ankles   Current Pain Ratin/10  Objective:   Treatment session included additional patient education. HEP instruction   ROM/strength WNL    Assessment:   Patient tolerated exercise well. Will assess tolerance for additional spinal loading next session. Plan:   PT 3 more visits     Date: 2023  Tx#:  Date: Tx#: 3/ Date: Tx#: 4/ Date: Tx#: 5/ Date: Tx#: 6/ Date: Tx#: 7/ Date: Tx#: 8/   TherEx TherEx TherEx TherEx TherEx TherEx TherEx   Elliptical 8 minutes          HEP gastroc/soleus stretch          Horizontal abduction red band 10 reps x 2 (HEP)         SL thoracic rotation 5 reps 5 second hold (HEP)         SL thoracic mobility 10 reps each (HEP)         Hip mobility/hamstring mobility - WNL no pain with stretch/ROM                                         Charges:  TherEx 3        Total Timed Treatment: 40 min  Total Treatment Time: 40 min

## 2023-11-03 ENCOUNTER — TELEPHONE (OUTPATIENT)
Dept: PHYSICAL THERAPY | Facility: HOSPITAL | Age: 53
End: 2023-11-03

## 2023-11-03 ENCOUNTER — HOSPITAL ENCOUNTER (OUTPATIENT)
Dept: CT IMAGING | Age: 53
Discharge: HOME | End: 2023-11-03
Attending: OTOLARYNGOLOGY
Payer: COMMERCIAL

## 2023-11-03 DIAGNOSIS — J32.8 OTHER CHRONIC SINUSITIS: ICD-10-CM

## 2023-11-03 DIAGNOSIS — R05.9 COUGH IN ADULT PATIENT: ICD-10-CM

## 2023-11-03 PROBLEM — Z63.5 MARITAL CONFLICT INVOLVING DIVORCE: Status: ACTIVE | Noted: 2023-11-03

## 2023-11-03 PROCEDURE — 70486 CT MAXILLOFACIAL W/O DYE: CPT | Performed by: OTOLARYNGOLOGY

## 2023-11-06 ENCOUNTER — APPOINTMENT (OUTPATIENT)
Dept: SPEECH THERAPY | Facility: HOSPITAL | Age: 53
End: 2023-11-06
Attending: INTERNAL MEDICINE
Payer: COMMERCIAL

## 2023-11-07 ENCOUNTER — OFFICE VISIT (OUTPATIENT)
Dept: PHYSICAL THERAPY | Age: 53
End: 2023-11-07
Attending: PHYSICIAN ASSISTANT
Payer: COMMERCIAL

## 2023-11-07 PROCEDURE — 97110 THERAPEUTIC EXERCISES: CPT | Performed by: PHYSICAL THERAPIST

## 2023-11-07 NOTE — PROGRESS NOTES
Dx:  Cervical spondylosis            Authorized # of Visits:  5  Fall Risk: standard         Precautions: n/a             Subjective:   Pain in neck and lower back   Current Pain Ratin/10  Objective:   No pain with lift/carry minimum of 20 pounds   Cervical ROM, T/L mobility WNL   Treatment session included hip hinge instruction       Assessment:   No strength/ROM deficits noted. No difficulty with lift/carry. Increased pain reported with 6 pound medicine ball overhead lift     Plan:   PT 2  more visits     Date: 2023  Tx#: 2 Date: 2023   Tx#: 3/ Date: Tx#: 4/ Date: Tx#: 5/ Date: Tx#: 6/ Date: Tx#: 7/ Date: Tx#: 8/   TherEx TherEx TherEx TherEx TherEx TherEx TherEx   Elliptical 8 minutes  Elliptical 9 minutes         HEP gastroc/soleus stretch  High knees with same side reach         Horizontal abduction red band 10 reps x 2 (HEP) Dynamic hamstrings with opposite arm reach         SL thoracic rotation 5 reps 5 second hold (HEP) Ant/posterior chain with bilat UE  10 reps each         SL thoracic mobility 10 reps each (HEP) Hip hinge training         Hip mobility/hamstring mobility - WNL no pain with stretch/ROM  Lift/carry 10 and 20 pounds > 40 feet each right and left UE         Forward step with 6 pound overhead lift, lateral step 5 reps each         Quadruped rock hip hinge                      Charges:  TherEx 3        Total Timed Treatment: 40 min  Total Treatment Time: 40 min

## 2023-11-08 ENCOUNTER — APPOINTMENT (OUTPATIENT)
Dept: PHYSICAL THERAPY | Age: 53
End: 2023-11-08
Attending: INTERNAL MEDICINE
Payer: COMMERCIAL

## 2023-11-13 ENCOUNTER — APPOINTMENT (OUTPATIENT)
Dept: SPEECH THERAPY | Facility: HOSPITAL | Age: 53
End: 2023-11-13
Attending: INTERNAL MEDICINE
Payer: COMMERCIAL

## 2023-11-13 ENCOUNTER — OFFICE VISIT (OUTPATIENT)
Dept: PHYSICAL THERAPY | Age: 53
End: 2023-11-13
Attending: INTERNAL MEDICINE
Payer: COMMERCIAL

## 2023-11-13 DIAGNOSIS — M62.89 PELVIC FLOOR DYSFUNCTION: Primary | ICD-10-CM

## 2023-11-13 PROCEDURE — 97530 THERAPEUTIC ACTIVITIES: CPT

## 2023-11-13 PROCEDURE — 97162 PT EVAL MOD COMPLEX 30 MIN: CPT
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
## 2023-11-14 ENCOUNTER — OFFICE VISIT (OUTPATIENT)
Dept: PHYSICAL THERAPY | Age: 53
End: 2023-11-14
Attending: PHYSICIAN ASSISTANT
Payer: COMMERCIAL

## 2023-11-14 PROCEDURE — 97140 MANUAL THERAPY 1/> REGIONS: CPT | Performed by: PHYSICAL THERAPIST

## 2023-11-14 PROCEDURE — 97110 THERAPEUTIC EXERCISES: CPT | Performed by: PHYSICAL THERAPIST

## 2023-11-14 NOTE — PROGRESS NOTES
Dx:  Cervical spondylosis            Authorized # of Visits:  5  Fall Risk: standard         Precautions: n/a             Subjective:   Pain in neck   Current Pain Ratin/10  Objective:    HEP additions as noted    Assessment:   Cervical spine pain primarily left sided. Decreased pain with support at lordosis and quadruped training     Plan:   PT 1 more visit     Date: 2023  Tx#: 2/5 Date: 2023   Tx#: 3/ Date: 2023   Tx#: 4/ Date: Tx#: 5/ Date: Tx#: 6/ Date: Tx#: 7/ Date: Tx#: 8/   TherEx TherEx TherEx TherEx TherEx TherEx TherEx   Elliptical 8 minutes  Elliptical 9 minutes  Elliptical 8 minutes        HEP gastroc/soleus stretch  High knees with same side reach  Frontal plane mobility UE  on wall, thoracic rotation on wall UE  10 reps each        Horizontal abduction red band 10 reps x 2 (HEP) Dynamic hamstrings with opposite arm reach  Knees to chest with SB > 15 reps        SL thoracic rotation 5 reps 5 second hold (HEP) Ant/posterior chain with bilat UE  10 reps each  Quadruped UE reaches (HEP)       SL thoracic mobility 10 reps each (HEP) Hip hinge training  D2 shoulder flexion red band (HEP)       Hip mobility/hamstring mobility - WNL no pain with stretch/ROM  Lift/carry 10 and 20 pounds > 40 feet each right and left UE Manual therapy, soft tissue release bilat UT, suboccipital release 10 min        Forward step with 6 pound overhead lift, lateral step 5 reps each         Quadruped rock hip hinge                      Charges:  TherEx 2, manual therapy        Total Timed Treatment: 45 min  Total Treatment Time: 45 min

## 2023-11-15 ENCOUNTER — APPOINTMENT (OUTPATIENT)
Dept: PHYSICAL THERAPY | Age: 53
End: 2023-11-15
Attending: INTERNAL MEDICINE
Payer: COMMERCIAL

## 2023-11-20 ENCOUNTER — OFFICE VISIT (OUTPATIENT)
Dept: PHYSICAL THERAPY | Age: 53
End: 2023-11-20
Attending: INTERNAL MEDICINE
Payer: COMMERCIAL

## 2023-11-20 ENCOUNTER — APPOINTMENT (OUTPATIENT)
Dept: SPEECH THERAPY | Facility: HOSPITAL | Age: 53
End: 2023-11-20
Attending: INTERNAL MEDICINE
Payer: COMMERCIAL

## 2023-11-20 DIAGNOSIS — M62.89 PELVIC FLOOR DYSFUNCTION: Primary | ICD-10-CM

## 2023-11-20 PROCEDURE — 97140 MANUAL THERAPY 1/> REGIONS: CPT

## 2023-11-20 PROCEDURE — 97530 THERAPEUTIC ACTIVITIES: CPT
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
## 2023-11-21 ENCOUNTER — OFFICE VISIT (OUTPATIENT)
Dept: PHYSICAL THERAPY | Age: 53
End: 2023-11-21
Attending: PHYSICIAN ASSISTANT
Payer: COMMERCIAL

## 2023-11-21 PROCEDURE — 97110 THERAPEUTIC EXERCISES: CPT | Performed by: PHYSICAL THERAPIST

## 2023-11-21 NOTE — PROGRESS NOTES
Dx:  Cervical spondylosis            Authorized # of Visits:  5  Fall Risk: standard         Precautions: n/a            Discharge Summary  Pt has attended 5 visits in Physical Therapy. Subjective:   Pain in neck. No UE complaints   Current Pain Ratin/10  Objective:    Cervical AROM WNL. Deep neck flexor activation - mild pain   Bilateral UE/LE strength WNL  Demonstrates proper mechanics lifting minimum of 20 pounds - no pain. Lifting overhead minimum of 8 pounds     Assessment:   Primary complaint remains cervical pain. Overall strength and ROM are WNL. No ROM/strength deficits are noted. Patient demonstrates understanding of link of posture to pain. Although she has completed therapy for cervical spine, she is attending therapy for pelvic floor training. The therapy  may be some additional benefit as relates to her spine and pain. Patient was very appreciative of assistance in her care. Goals: (to be met in 5 visits)   Patient will demonstrate lift/carry minimum of 20 pounds - no pain - Met   Independent with HEP - Met   Patient will report improved tolerance for standing/sitting - progressing    Post Neck Disability Index Score  Post Score: 46 % (2023 10:32 AM)    32 % improvement    Plan: Discontinue PT. Patient to continue with HEP         Thank you for your referral. If you have any questions, please contact me at Dept: [phone]. Sincerely,  Electronically signed by therapist: Katelyn Gonzalez PT     Physician's certification required:  No  Please co-sign or sign and return this letter via fax as soon as possible to [phone]. I certify the need for these services furnished under this plan of treatment and while under my care. X___________________________________________________ Date____________________    Certification From:   To:2024    Date: 2023  Tx#: 2 Date: 2023   Tx#: 3/ Date: 2023   Tx#: / Date: 2023   Tx#: 5/ Date:    Tx#: 6/ Date: Tx#: 7/ Date: Tx#: 8/   TherEx TherEx TherEx TherEx TherEx TherEx TherEx   Elliptical 8 minutes  Elliptical 9 minutes  Elliptical 8 minutes  Elliptical 8 minutes       HEP gastroc/soleus stretch  High knees with same side reach  Frontal plane mobility UE  on wall, thoracic rotation on wall UE  10 reps each  Ant/posterior chain bilat UE  10 reps each       Horizontal abduction red band 10 reps x 2 (HEP) Dynamic hamstrings with opposite arm reach  Knees to chest with SB > 15 reps  Forward step same side bilateral reach 10 reps  each      SL thoracic rotation 5 reps 5 second hold (HEP) Ant/posterior chain with bilat UE  10 reps each  Quadruped UE reaches (HEP) TRX retraction 10 reps x 2       SL thoracic mobility 10 reps each (HEP) Hip hinge training  D2 shoulder flexion red band (HEP) 10 and 20 pound lift/carry right and left UE      Hip mobility/hamstring mobility - WNL no pain with stretch/ROM  Lift/carry 10 and 20 pounds > 40 feet each right and left UE Manual therapy, soft tissue release bilat UT, suboccipital release 10 min 8 pound medicine ball overhead lift > 10 reps each        Forward step with 6 pound overhead lift, lateral step 5 reps each  Supine hip/knee flex/exten no touch 5 reps , 5 reps with opposite hip in flexion        Quadruped rock hip hinge   Quadruped rock flexion/hip hinge          Hip hinge standing - improved           Charges:  TherEx 3    Total Timed Treatment: 45 min  Total Treatment Time: 45 min

## 2023-11-22 ENCOUNTER — OFFICE VISIT (OUTPATIENT)
Dept: SPEECH THERAPY | Facility: HOSPITAL | Age: 53
End: 2023-11-22
Attending: INTERNAL MEDICINE
Payer: COMMERCIAL

## 2023-11-22 ENCOUNTER — APPOINTMENT (OUTPATIENT)
Dept: PHYSICAL THERAPY | Age: 53
End: 2023-11-22
Attending: INTERNAL MEDICINE
Payer: COMMERCIAL

## 2023-11-22 PROCEDURE — 92507 TX SP LANG VOICE COMM INDIV: CPT

## 2023-11-22 NOTE — PROGRESS NOTES
Diagnosis:   Vocal cord dysfunction [J38.3]         Referring Provider: Beto Ramirez  Date of Evaluation:   10/16/2023    Precautions:  None Next MD visit:   none scheduled  Date of Surgery: n/a   Insurance Primary/Secondary: [employer] [place] HMO / N/A       # Visits on POC: 3   Total Timed Treatment: 35 min  Date POC Expires: 1/14/2024  Total Treatment time: 35 min  Charges: [id number]   Treatment Number: 4     Discharge Summary  Pt has attended 4 visits in Speech Therapy. Dear Dr. Beto Raimrez,  This letter is to inform you of Prashant Adams's progress in speech-language therapy. Since her initial evaluation, Prashant Leiva has attended 4 sessions. Therapy sessions have targeted training of vocal cord dysfunction rescue breathing and diaphragmatic breathing/breath support strategies. A home exercise program (HEP) addressing use of strategies and rescue breathing has been provided and completed consistently. During this treatment period, Prashant Leiva has demonstrated improved ability to manage and prevent vocal cord dysfunction attacks. As Prashant Leiva has demonstrated significant progress and has met all goals and demonstrates independence with strategies, it is recommended that she be discharged from speech therapy at this time. Thank you for your referral. Please re-consult should further concerns arise. Subjective: Patient arrived late to session. Patient participated actively in therapeutic tasks. Pain: No pain reported on this date. Patient not being seen for pain. Objective:  Goals:    1)  Pt will independently demonstrate easy breathing strategies and methods for preventing/remediating a VCD/chronic cough attack in 8/10 trials at rest.  Progress: 10/10 trials at rest. Goal met. 2)  Pt will independently demonstrate easy breathing strategies during mild to moderate exertion in 8/10 trials. Progress: 10/10 trials. Goal met.      3)  Pt will report improved breathing and decreased coughing across daily tasks for the duration of 1 month in order to improve his/her ability to breathe efficiently and fully participate in all daily activities. Progress: Goal met per patient report. HEP: Use of rescue breathing and diaphragmatic breathing at rest  Education: Vocal anatomy and physiology and impact of respiration on phonation    Assessment: Patient presented to speech therapy with c/o vocal cord dysfunction characterized by difficulty inhaling, laryngeal tightness/tension during episodes of SOB, improved breathing when laryngeal tension decreases, quickly resolving breathing symptoms following episodes, poor response to asthma medication, increased difficulty breathing in stressful situations. Throughout plan of care, patient has demonstrated significant improvement and progress toward goals. Patient reports significantly decreased vocal cord dysfunction episodes/attacks. Patient is independent in her ability to control/manage and prevent VCD attacks and utilize diaphragmatic breathing/relaxed throat breathing. Plan: Patient to be discharged from speech-language therapy as all goals have been met. Patient/Family/Caregiver was advised of these findings, precautions, and treatment options and has agreed to actively participate in planning and for this course of care. Thank you for your referral. If you have any questions, please contact me at Dept: [phone].     Sincerely,  Electronically signed by therapist: Torrey Andrews, SLP

## 2023-11-29 ENCOUNTER — OFFICE VISIT (OUTPATIENT)
Dept: PHYSICAL THERAPY | Age: 53
End: 2023-11-29
Attending: INTERNAL MEDICINE
Payer: COMMERCIAL

## 2023-11-29 ENCOUNTER — APPOINTMENT (OUTPATIENT)
Dept: PHYSICAL THERAPY | Age: 53
End: 2023-11-29
Attending: INTERNAL MEDICINE
Payer: COMMERCIAL

## 2023-11-29 DIAGNOSIS — M62.89 PELVIC FLOOR DYSFUNCTION: Primary | ICD-10-CM

## 2023-11-29 PROCEDURE — 97530 THERAPEUTIC ACTIVITIES: CPT

## 2023-11-29 PROCEDURE — 97140 MANUAL THERAPY 1/> REGIONS: CPT
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
## 2023-12-05 ENCOUNTER — OFFICE VISIT (OUTPATIENT)
Dept: PHYSICAL THERAPY | Age: 53
End: 2023-12-05
Attending: INTERNAL MEDICINE
Payer: COMMERCIAL

## 2023-12-05 DIAGNOSIS — M62.89 PELVIC FLOOR DYSFUNCTION: Primary | ICD-10-CM

## 2023-12-05 PROCEDURE — 97140 MANUAL THERAPY 1/> REGIONS: CPT

## 2023-12-05 PROCEDURE — 97530 THERAPEUTIC ACTIVITIES: CPT

## 2023-12-06 ENCOUNTER — APPOINTMENT (OUTPATIENT)
Dept: PHYSICAL THERAPY | Age: 53
End: 2023-12-06
Attending: INTERNAL MEDICINE
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
## 2023-12-13 ENCOUNTER — APPOINTMENT (OUTPATIENT)
Dept: PHYSICAL THERAPY | Age: 53
End: 2023-12-13
Attending: INTERNAL MEDICINE
Payer: COMMERCIAL

## 2023-12-13 ENCOUNTER — APPOINTMENT (OUTPATIENT)
Dept: PHYSICAL THERAPY | Age: 53
End: 2023-12-13
Attending: INTERNAL MEDICINE

## 2023-12-15 ENCOUNTER — PATIENT MESSAGE (OUTPATIENT)
Facility: CLINIC | Age: 53
End: 2023-12-15

## 2023-12-19 NOTE — TELEPHONE ENCOUNTER
From: Juanjo Robbins  To: Anne Fernandez  Sent: 12/15/2023 9:09 PM CST  Subject: APAP    Good evening. I am writing to say that the CPAP machine is not working for me. I am unable to get any quality of rest. The headset is too large and is uncomfortable. I sleep on my stomach and most nights shift to my right or my left side . I toss and turn quite a l ot and and sometimes end up at the foot or diagonally on my bed. This headset is inadequate and I tend to remove it in my sleep. I an also very tired during the day and may even feel worse. Please advise. Thank you. Sincerely,     Saad Loya.

## 2023-12-20 ENCOUNTER — APPOINTMENT (OUTPATIENT)
Dept: PHYSICAL THERAPY | Age: 53
End: 2023-12-20
Attending: INTERNAL MEDICINE

## 2023-12-20 ENCOUNTER — OFFICE VISIT (OUTPATIENT)
Dept: PHYSICAL THERAPY | Age: 53
End: 2023-12-20
Attending: INTERNAL MEDICINE
Payer: COMMERCIAL

## 2023-12-20 DIAGNOSIS — M62.89 PELVIC FLOOR DYSFUNCTION: Primary | ICD-10-CM

## 2023-12-20 PROCEDURE — 97110 THERAPEUTIC EXERCISES: CPT

## 2023-12-20 PROCEDURE — 97530 THERAPEUTIC ACTIVITIES: CPT

## 2023-12-20 PROCEDURE — 97140 MANUAL THERAPY 1/> REGIONS: CPT
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
## 2023-12-20 NOTE — ED PROVIDER NOTES
Patient Seen in: [facility] [place]    History   Patient presents with:  Cough/URI    Stated Complaint: URI SYMPTOMS    HPI    Esther Lara is a 12-year-old female with a past medical history of anxiety, degenerative disc disease, chronic as needed for Pain. HYDROCHLOROTHIAZIDE 25 MG Oral Tab,  TAKE 1 TABLET BY MOUTH EVERY MORNING   ATENOLOL 25 MG Oral Tab,  TAKE 1 TABLET (25 MG TOTAL) BY MOUTH DAILY. Meloxicam 5 MG Oral Cap,  Take by mouth.    AmLODIPine Besylate 10 MG Oral Tab,  Take 1 present. Left Ear: Ear canal normal. Tympanic membrane is bulging. A middle ear effusion is present. Nose: Mucosal edema present. Right sinus exhibits maxillary sinus tenderness and frontal sinus tenderness.  Left sinus exhibits maxillary sinus tenderne Normal. PLEURA:  Normal.  No pleural effusions. BONES:  Normal for age. CONCLUSION:  No acute disease.      Dictated by: Bina Cruz MD on 7/01/2017 at 17:32     Approved by: Bina Cruz MD            This case is discussed with Dr. Alejandro Barry who 2 (mild pain)

## 2023-12-27 ENCOUNTER — OFFICE VISIT (OUTPATIENT)
Dept: PHYSICAL THERAPY | Age: 53
End: 2023-12-27
Attending: INTERNAL MEDICINE
Payer: COMMERCIAL

## 2023-12-27 ENCOUNTER — APPOINTMENT (OUTPATIENT)
Dept: PHYSICAL THERAPY | Age: 53
End: 2023-12-27
Attending: INTERNAL MEDICINE

## 2023-12-27 DIAGNOSIS — M62.89 PELVIC FLOOR DYSFUNCTION: Primary | ICD-10-CM

## 2023-12-27 PROCEDURE — 97110 THERAPEUTIC EXERCISES: CPT

## 2023-12-27 PROCEDURE — 97140 MANUAL THERAPY 1/> REGIONS: CPT

## 2023-12-27 PROCEDURE — 97530 THERAPEUTIC ACTIVITIES: CPT
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
## 2024-01-03 ENCOUNTER — APPOINTMENT (OUTPATIENT)
Dept: PHYSICAL THERAPY | Age: 54
End: 2024-01-03
Attending: INTERNAL MEDICINE

## 2024-01-10 ENCOUNTER — OFFICE VISIT (OUTPATIENT)
Dept: PHYSICAL THERAPY | Age: 54
End: 2024-01-10
Attending: INTERNAL MEDICINE

## 2024-01-10 ENCOUNTER — TELEPHONE (OUTPATIENT)
Dept: PHYSICAL THERAPY | Age: 54
End: 2024-01-10

## 2024-01-10 DIAGNOSIS — M62.89 PELVIC FLOOR DYSFUNCTION: Primary | ICD-10-CM

## 2024-01-10 PROCEDURE — 97140 MANUAL THERAPY 1/> REGIONS: CPT

## 2024-01-10 PROCEDURE — 97530 THERAPEUTIC ACTIVITIES: CPT

## 2024-01-10 PROCEDURE — 97110 THERAPEUTIC EXERCISES: CPT
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
## 2024-01-16 ENCOUNTER — TELEPHONE (OUTPATIENT)
Facility: CLINIC | Age: 54
End: 2024-01-16

## 2024-01-16 ENCOUNTER — TELEMEDICINE (OUTPATIENT)
Facility: CLINIC | Age: 54
End: 2024-01-16
Payer: COMMERCIAL

## 2024-01-16 DIAGNOSIS — J30.9 CHRONIC ALLERGIC RHINITIS: ICD-10-CM

## 2024-01-16 DIAGNOSIS — G47.10 HYPERSOMNIA: ICD-10-CM

## 2024-01-16 DIAGNOSIS — G47.33 OSA (OBSTRUCTIVE SLEEP APNEA): Primary | ICD-10-CM

## 2024-01-16 DIAGNOSIS — G47.33 OBSTRUCTIVE SLEEP APNEA: Primary | ICD-10-CM

## 2024-01-16 PROCEDURE — 99214 OFFICE O/P EST MOD 30 MIN: CPT | Performed by: OTHER

## 2024-01-16 NOTE — TELEPHONE ENCOUNTER
01/16/2024, 01:54 PM    by CAROLYN AGUILAR    Settings sent to device    Request [id number]    Set Mode to AutoSet  Set Min Pressure to 7 cmH2O  Set Max Pressure to 12 cmH2O  Set Response to Standard  Set EPR to Off  Set Ramp enable to Auto  Set Start pressure to 4.0 cmH2O  Set Climate Control to Auto  Set Humidifier level to Auto  Set Tube temperature to Auto  12/31/2023, 12:54 PM    Settings confirmed on device    Set Mode to AutoSet  Set Min Pressure to 7 cmH2O  Set Max Pressure to 10 cmH2O  Set Response to Standard  Set EPR to Off  Set Ramp enable to Auto  Set Start pressure to 4.0 cmH2O  Set Climate Control to Auto  Set Humidifier level to Auto  Set Tube temperature to Auto
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
## 2024-01-17 ENCOUNTER — OFFICE VISIT (OUTPATIENT)
Dept: PHYSICAL THERAPY | Age: 54
End: 2024-01-17
Attending: INTERNAL MEDICINE

## 2024-01-17 DIAGNOSIS — M62.89 PELVIC FLOOR DYSFUNCTION: Primary | ICD-10-CM

## 2024-01-17 PROCEDURE — 97110 THERAPEUTIC EXERCISES: CPT

## 2024-01-17 PROCEDURE — 97530 THERAPEUTIC ACTIVITIES: CPT

## 2024-01-17 PROCEDURE — 97140 MANUAL THERAPY 1/> REGIONS: CPT
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
## 2024-01-24 ENCOUNTER — OFFICE VISIT (OUTPATIENT)
Dept: PHYSICAL THERAPY | Age: 54
End: 2024-01-24
Attending: INTERNAL MEDICINE

## 2024-01-24 DIAGNOSIS — M62.89 PELVIC FLOOR DYSFUNCTION: Primary | ICD-10-CM

## 2024-01-24 PROCEDURE — 97110 THERAPEUTIC EXERCISES: CPT

## 2024-01-24 PROCEDURE — 97140 MANUAL THERAPY 1/> REGIONS: CPT
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
## 2024-01-29 ENCOUNTER — OFFICE VISIT (OUTPATIENT)
Dept: PHYSICAL THERAPY | Age: 54
End: 2024-01-29
Attending: INTERNAL MEDICINE

## 2024-01-29 DIAGNOSIS — M62.89 PELVIC FLOOR DYSFUNCTION: Primary | ICD-10-CM

## 2024-01-29 PROCEDURE — 97110 THERAPEUTIC EXERCISES: CPT

## 2024-02-09 ENCOUNTER — MED REC SCAN ONLY (OUTPATIENT)
Facility: CLINIC | Age: 54
End: 2024-02-09

## 2024-02-21 NOTE — LETTER
July 1, 2017    Patient: Heather Castellon   Date of Visit: 7/1/2017       To Whom It May Concern:    Digna Castellon was seen and treated in our emergency department on 7/1/2017. She should not return to work until 7/4/17.     If you have any q No

## 2024-03-02 DIAGNOSIS — J30.9 CHRONIC ALLERGIC RHINITIS: ICD-10-CM

## 2024-03-02 DIAGNOSIS — E78.00 PURE HYPERCHOLESTEROLEMIA: ICD-10-CM

## 2024-03-05 RX ORDER — ATORVASTATIN CALCIUM 10 MG/1
10 TABLET, FILM COATED ORAL NIGHTLY
Qty: 90 TABLET | Refills: 0 | Status: SHIPPED | OUTPATIENT
Start: 2024-03-05

## 2024-03-05 RX ORDER — MONTELUKAST SODIUM 10 MG/1
10 TABLET ORAL NIGHTLY
Qty: 90 TABLET | Refills: 0 | Status: SHIPPED | OUTPATIENT
Start: 2024-03-05

## 2024-03-05 NOTE — TELEPHONE ENCOUNTER
Requested Renewals     Name from pharmacy: Montelukast Sodium 10 Mg Tab Auro         Will file in chart as: MONTELUKAST 10 MG Oral Tab    Sig: Take 1 tablet (10 mg total) by mouth nightly.    Original sig: Take 1 tablet (10 mg total) by mouth nightly    Disp: 90 tablet    Refills: 0    Start: 3/2/2024    Class: Normal    Non-formulary For: Chronic allergic rhinitis    Last ordered: 6 months ago (8/15/2023) by Aisha Jewell MD    Last refill: 11/27/2023    Rx #: [id number]    Asthma & COPD Medication Protocol Ehcxlb5503/02/2024 04:12 PM   Protocol Details Asthma Action Score greater than or equal to 20    Appointment in past 6 or next 3 months    AAP/ACT given in last 12 months       Name from pharmacy: Atorvastatin Calcium 10 Mg Tab Nort         Will file in chart as: ATORVASTATIN 10 MG Oral Tab    Sig: TAKE ONE TABLET BY MOUTH NIGHTLY    Disp: 90 tablet    Refills: 0    Start: 3/2/2024    Class: Normal    Non-formulary For: Pure hypercholesterolemia    Last ordered: 6 months ago (8/11/2023) by Aisha Jewell MD    Last refill: 11/27/2023    Rx #: [id number]    Cholesterol Medication Protocol Kntawb7203/02/2024 04:12 PM   Protocol Details ALT < 80    ALT resulted within past year    Lipid panel within past 12 months    In person appointment or virtual visit in the past 12 mos or appointment in next 3 mos      To be filled at: [id number] - [address] [phone], [phone]        LOV: 05/25/2023  RTC:1 year  RECENT LABS: none  FOV:none

## 2024-03-19 ENCOUNTER — MED REC SCAN ONLY (OUTPATIENT)
Facility: CLINIC | Age: 54
End: 2024-03-19

## 2024-03-24 ENCOUNTER — HOSPITAL ENCOUNTER (OUTPATIENT)
Age: 54
Discharge: HOME | End: 2024-03-24
Payer: COMMERCIAL

## 2024-03-24 VITALS
HEIGHT: 66 IN | BODY MASS INDEX: 20.89 KG/M2 | RESPIRATION RATE: 18 BRPM | HEART RATE: 105 BPM | SYSTOLIC BLOOD PRESSURE: 134 MMHG | DIASTOLIC BLOOD PRESSURE: 97 MMHG | TEMPERATURE: 98 F | WEIGHT: 130 LBS | OXYGEN SATURATION: 100 %

## 2024-03-24 DIAGNOSIS — N89.8 VAGINAL ODOR: Primary | ICD-10-CM

## 2024-03-24 PROCEDURE — 99459 PELVIC EXAMINATION: CPT

## 2024-03-24 PROCEDURE — 99214 OFFICE O/P EST MOD 30 MIN: CPT

## 2024-03-24 PROCEDURE — 87591 N.GONORRHOEAE DNA AMP PROB: CPT | Performed by: NURSE PRACTITIONER

## 2024-03-24 PROCEDURE — 87491 CHLMYD TRACH DNA AMP PROBE: CPT | Performed by: NURSE PRACTITIONER

## 2024-03-24 PROCEDURE — 81514 NFCT DS BV&VAGINITIS DNA ALG: CPT | Performed by: NURSE PRACTITIONER

## 2024-03-24 NOTE — ED INITIAL ASSESSMENT (HPI)
Pt noting foul odor while on the toilet yesterday, pt denies urinary s/s, no discharge but states that she feels some pain in low mid abdomen
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
## 2024-03-25 ENCOUNTER — TELEPHONE (OUTPATIENT)
Dept: INTERNAL MEDICINE CLINIC | Facility: CLINIC | Age: 54
End: 2024-03-25

## 2024-03-25 LAB
BV BACTERIA DNA VAG QL NAA+PROBE: POSITIVE
C GLABRATA DNA VAG QL NAA+PROBE: NEGATIVE
C KRUSEI DNA VAG QL NAA+PROBE: NEGATIVE
C TRACH DNA SPEC QL NAA+PROBE: NEGATIVE
CANDIDA DNA VAG QL NAA+PROBE: NEGATIVE
N GONORRHOEA DNA SPEC QL NAA+PROBE: NEGATIVE
T VAGINALIS DNA VAG QL NAA+PROBE: NEGATIVE

## 2024-03-25 RX ORDER — METRONIDAZOLE 500 MG/1
500 TABLET ORAL 3 TIMES DAILY
Qty: 21 TABLET | Refills: 0 | Status: SHIPPED | OUTPATIENT
Start: 2024-03-25 | End: 2024-04-01

## 2024-03-25 NOTE — TELEPHONE ENCOUNTER
Attempted to call patient, LMTCB. See message from PSR.   Any alternative patient can take? Metrogel?      Last [facility] visit   3/24/2024  metRONIDAZOLE 500 MG Oral Tab 21 tablet 0 3/25/2024 4/1/2024    Sig - Route: Take 1 tablet (500 mg total) by mouth 3 (three) times daily for 7 days. - Oral      Collected 3/24/2024  2:20 PM       Status: Final result    Specimen Information: Vaginal; Other   0 Result Notes      Component  Ref Range & Units    Bacterial Vaginosis  Negative Positive Abnormal               LOV:   05/25/2023 Dr Jewell

## 2024-03-25 NOTE — TELEPHONE ENCOUNTER
S/w with patient. Explained to patient that UC sent in rx for Metronidazole 500 mg TID for 7 days today. Patient is ok with taking medication for 1 week. Advised to avoid alcohol use and reach out if continues with symptoms after completing rx.     Also patient wanted to make you aware that she is taking Magnesium glycinate for heat flashes recommended by UC. Added to med list.     Scheduled FOV as not seen since 05/2023.

## 2024-03-25 NOTE — TELEPHONE ENCOUNTER
Patient called the office to report she just tested positive for bacterial vaginosis with a 2 week treatment timeframe with antibiotics, patient is requesting an alternative treatment that doesn't involve a lot of extra medication as she states she takes a lot of medication as it is. Please call back at [phone] and advise.

## 2024-04-22 ENCOUNTER — HOSPITAL ENCOUNTER (OUTPATIENT)
Dept: MAMMOGRAPHY | Age: 54
Discharge: HOME | End: 2024-04-22
Payer: COMMERCIAL

## 2024-04-22 ENCOUNTER — HOSPITAL ENCOUNTER (OUTPATIENT)
Dept: ULTRASOUND IMAGING | Age: 54
Discharge: HOME | End: 2024-04-22
Payer: COMMERCIAL

## 2024-04-22 DIAGNOSIS — N60.01 BILATERAL BREAST CYSTS: ICD-10-CM

## 2024-04-22 DIAGNOSIS — N60.02 BILATERAL BREAST CYSTS: ICD-10-CM

## 2024-04-22 PROCEDURE — 77066 DX MAMMO INCL CAD BI: CPT

## 2024-04-22 PROCEDURE — 77062 BREAST TOMOSYNTHESIS BI: CPT

## 2024-04-22 PROCEDURE — 76642 ULTRASOUND BREAST LIMITED: CPT

## 2024-04-25 ENCOUNTER — OFFICE VISIT (OUTPATIENT)
Dept: INTERNAL MEDICINE CLINIC | Facility: CLINIC | Age: 54
End: 2024-04-25
Payer: COMMERCIAL

## 2024-04-25 ENCOUNTER — LAB ENCOUNTER (OUTPATIENT)
Dept: LAB | Age: 54
End: 2024-04-25
Attending: INTERNAL MEDICINE
Payer: COMMERCIAL

## 2024-04-25 VITALS
HEIGHT: 66 IN | SYSTOLIC BLOOD PRESSURE: 100 MMHG | BODY MASS INDEX: 23.76 KG/M2 | TEMPERATURE: 98 F | DIASTOLIC BLOOD PRESSURE: 60 MMHG | WEIGHT: 147.81 LBS

## 2024-04-25 DIAGNOSIS — Z23 NEED FOR SHINGLES VACCINE: ICD-10-CM

## 2024-04-25 DIAGNOSIS — E55.9 VITAMIN D DEFICIENCY: ICD-10-CM

## 2024-04-25 DIAGNOSIS — Z00.00 ENCOUNTER FOR PREVENTATIVE ADULT HEALTH CARE EXAMINATION: Primary | ICD-10-CM

## 2024-04-25 DIAGNOSIS — I10 PRIMARY HYPERTENSION: Chronic | ICD-10-CM

## 2024-04-25 DIAGNOSIS — R23.2 HOT FLASHES: ICD-10-CM

## 2024-04-25 DIAGNOSIS — E78.00 PURE HYPERCHOLESTEROLEMIA: ICD-10-CM

## 2024-04-25 DIAGNOSIS — Z00.00 ENCOUNTER FOR PREVENTATIVE ADULT HEALTH CARE EXAMINATION: ICD-10-CM

## 2024-04-25 LAB
ALBUMIN SERPL-MCNC: 4.3 G/DL (ref 3.2–4.8)
ALBUMIN/GLOB SERPL: 1.5 {RATIO} (ref 1–2)
ALP LIVER SERPL-CCNC: 75 U/L
ALT SERPL-CCNC: 12 U/L
ANION GAP SERPL CALC-SCNC: 7 MMOL/L (ref 0–18)
AST SERPL-CCNC: 19 U/L (ref ?–34)
BASOPHILS # BLD AUTO: 0.04 X10(3) UL (ref 0–0.2)
BASOPHILS NFR BLD AUTO: 0.5 %
BILIRUB SERPL-MCNC: 0.4 MG/DL (ref 0.3–1.2)
BUN BLD-MCNC: 11 MG/DL (ref 9–23)
BUN/CREAT SERPL: 11.8 (ref 10–20)
CALCIUM BLD-MCNC: 9.6 MG/DL (ref 8.7–10.4)
CHLORIDE SERPL-SCNC: 104 MMOL/L (ref 98–112)
CHOLEST SERPL-MCNC: 240 MG/DL (ref ?–200)
CO2 SERPL-SCNC: 31 MMOL/L (ref 21–32)
CREAT BLD-MCNC: 0.93 MG/DL
DEPRECATED RDW RBC AUTO: 50.6 FL (ref 35.1–46.3)
EGFRCR SERPLBLD CKD-EPI 2021: 73 ML/MIN/1.73M2 (ref 60–?)
EOSINOPHIL # BLD AUTO: 0.18 X10(3) UL (ref 0–0.7)
EOSINOPHIL NFR BLD AUTO: 2.2 %
ERYTHROCYTE [DISTWIDTH] IN BLOOD BY AUTOMATED COUNT: 16.3 % (ref 11–15)
EST. AVERAGE GLUCOSE BLD GHB EST-MCNC: 117 MG/DL (ref 68–126)
ESTRADIOL SERPL-MCNC: 26.5 PG/ML
FASTING PATIENT LIPID ANSWER: YES
FASTING STATUS PATIENT QL REPORTED: YES
FSH SERPL-ACNC: 110.9 MIU/ML
GLOBULIN PLAS-MCNC: 2.8 G/DL (ref 2.8–4.4)
GLUCOSE BLD-MCNC: 98 MG/DL (ref 70–99)
HBA1C MFR BLD: 5.7 % (ref ?–5.7)
HCT VFR BLD AUTO: 36.3 %
HDLC SERPL-MCNC: 103 MG/DL (ref 40–59)
HGB BLD-MCNC: 11.9 G/DL
IMM GRANULOCYTES # BLD AUTO: 0.03 X10(3) UL (ref 0–1)
IMM GRANULOCYTES NFR BLD: 0.4 %
LDLC SERPL CALC-MCNC: 127 MG/DL (ref ?–100)
LH SERPL-ACNC: 68.7 MIU/ML
LYMPHOCYTES # BLD AUTO: 2.84 X10(3) UL (ref 1–4)
LYMPHOCYTES NFR BLD AUTO: 35.3 %
MCH RBC QN AUTO: 28.3 PG (ref 26–34)
MCHC RBC AUTO-ENTMCNC: 32.8 G/DL (ref 31–37)
MCV RBC AUTO: 86.2 FL
MONOCYTES # BLD AUTO: 0.65 X10(3) UL (ref 0.1–1)
MONOCYTES NFR BLD AUTO: 8.1 %
NEUTROPHILS # BLD AUTO: 4.31 X10 (3) UL (ref 1.5–7.7)
NEUTROPHILS # BLD AUTO: 4.31 X10(3) UL (ref 1.5–7.7)
NEUTROPHILS NFR BLD AUTO: 53.5 %
NONHDLC SERPL-MCNC: 137 MG/DL (ref ?–130)
OSMOLALITY SERPL CALC.SUM OF ELEC: 293 MOSM/KG (ref 275–295)
PLATELET # BLD AUTO: 267 10(3)UL (ref 150–450)
POTASSIUM SERPL-SCNC: 3.4 MMOL/L (ref 3.5–5.1)
PROGEST SERPL-MCNC: 0.24 NG/ML
PROT SERPL-MCNC: 7.1 G/DL (ref 5.7–8.2)
RBC # BLD AUTO: 4.21 X10(6)UL
SODIUM SERPL-SCNC: 142 MMOL/L (ref 136–145)
TRIGL SERPL-MCNC: 61 MG/DL (ref 30–149)
TSI SER-ACNC: 1.09 MIU/ML (ref 0.55–4.78)
VIT D+METAB SERPL-MCNC: 22.9 NG/ML (ref 30–100)
VLDLC SERPL CALC-MCNC: 11 MG/DL (ref 0–30)
WBC # BLD AUTO: 8.1 X10(3) UL (ref 4–11)

## 2024-04-25 PROCEDURE — 80061 LIPID PANEL: CPT

## 2024-04-25 PROCEDURE — 85025 COMPLETE CBC W/AUTO DIFF WBC: CPT

## 2024-04-25 PROCEDURE — 82670 ASSAY OF TOTAL ESTRADIOL: CPT

## 2024-04-25 PROCEDURE — 90750 HZV VACC RECOMBINANT IM: CPT | Performed by: INTERNAL MEDICINE

## 2024-04-25 PROCEDURE — 90471 IMMUNIZATION ADMIN: CPT | Performed by: INTERNAL MEDICINE

## 2024-04-25 PROCEDURE — 82306 VITAMIN D 25 HYDROXY: CPT

## 2024-04-25 PROCEDURE — 80053 COMPREHEN METABOLIC PANEL: CPT

## 2024-04-25 PROCEDURE — 83002 ASSAY OF GONADOTROPIN (LH): CPT

## 2024-04-25 PROCEDURE — 84443 ASSAY THYROID STIM HORMONE: CPT

## 2024-04-25 PROCEDURE — 84144 ASSAY OF PROGESTERONE: CPT

## 2024-04-25 PROCEDURE — 83036 HEMOGLOBIN GLYCOSYLATED A1C: CPT

## 2024-04-25 PROCEDURE — 83001 ASSAY OF GONADOTROPIN (FSH): CPT

## 2024-04-25 PROCEDURE — 99396 PREV VISIT EST AGE 40-64: CPT | Performed by: INTERNAL MEDICINE

## 2024-04-25 PROCEDURE — 36415 COLL VENOUS BLD VENIPUNCTURE: CPT
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
## 2024-04-25 NOTE — PATIENT INSTRUCTIONS
- Get blood tests done today  - Second shingles shot given today.  You do not need any more after this.  - Follow up with our [facility]ologists to discuss menopausal symptoms:  Dr. López  [address]  [place], [place] [address] [phone]    [address] [facility] [address]  [place], [place] [address] [phone]    Dr. Trujillo and others  [address]  [place] [address] [phone]    [address] [facility] [address]  [place], [place] [address] [phone]    It was a pleasure seeing you in the clinic today.  Thank you for choosing the [facility] [place] office for your healthcare needs. Please call at [phone] with any questions or concerns.    Aisha Jewell MD

## 2024-05-01 ENCOUNTER — OFFICE VISIT (OUTPATIENT)
Facility: CLINIC | Age: 54
End: 2024-05-01
Payer: COMMERCIAL

## 2024-05-01 VITALS
DIASTOLIC BLOOD PRESSURE: 66 MMHG | HEIGHT: 66 IN | HEART RATE: 100 BPM | BODY MASS INDEX: 23.43 KG/M2 | WEIGHT: 145.81 LBS | SYSTOLIC BLOOD PRESSURE: 100 MMHG

## 2024-05-01 DIAGNOSIS — N89.8 VAGINAL DISCHARGE: Primary | ICD-10-CM

## 2024-05-01 DIAGNOSIS — N95.1 PERIMENOPAUSAL VASOMOTOR SYMPTOMS: ICD-10-CM

## 2024-05-01 DIAGNOSIS — N95.8 GENITOURINARY SYNDROME OF MENOPAUSE: ICD-10-CM

## 2024-05-01 PROCEDURE — 81514 NFCT DS BV&VAGINITIS DNA ALG: CPT

## 2024-05-01 PROCEDURE — 99203 OFFICE O/P NEW LOW 30 MIN: CPT

## 2024-05-01 RX ORDER — ESTRADIOL 0.04 MG/D
1 FILM, EXTENDED RELEASE TRANSDERMAL
Qty: 8 EACH | Refills: 2 | Status: SHIPPED | OUTPATIENT
Start: 2024-05-02

## 2024-05-01 RX ORDER — ESTRADIOL 0.1 MG/G
CREAM VAGINAL
Qty: 42.5 G | Refills: 3 | Status: SHIPPED | OUTPATIENT
Start: 2024-05-01
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
## 2024-05-02 LAB
BV BACTERIA DNA VAG QL NAA+PROBE: NEGATIVE
C GLABRATA DNA VAG QL NAA+PROBE: NEGATIVE
C KRUSEI DNA VAG QL NAA+PROBE: NEGATIVE
CANDIDA DNA VAG QL NAA+PROBE: NEGATIVE
T VAGINALIS DNA VAG QL NAA+PROBE: NEGATIVE

## 2024-05-13 ENCOUNTER — TELEPHONE (OUTPATIENT)
Facility: CLINIC | Age: 54
End: 2024-05-13

## 2024-05-13 NOTE — TELEPHONE ENCOUNTER
Patient saw Rowena PEÑA 05/01/2024, has questions regarding test results and possibly trying a new medication.

## 2024-05-13 NOTE — TELEPHONE ENCOUNTER
Spoke with patient. Had a vaginitis panel done 5/1/24. It was negative. Not sure if the test was good due to vaginal lubricant. She thinks she has a yeast infection. Requesting oral treatment. I let her know that our providers don't prescribe medications over the phone. She would need to come in for a vaginal culture. Aware she can try over the counter Monistat. Verbalized understanding.

## 2024-05-21 ENCOUNTER — TELEPHONE (OUTPATIENT)
Facility: CLINIC | Age: 54
End: 2024-05-21

## 2024-05-21 ENCOUNTER — OFFICE VISIT (OUTPATIENT)
Facility: CLINIC | Age: 54
End: 2024-05-21

## 2024-05-21 VITALS
SYSTOLIC BLOOD PRESSURE: 126 MMHG | HEIGHT: 66 IN | DIASTOLIC BLOOD PRESSURE: 70 MMHG | HEART RATE: 93 BPM | WEIGHT: 141 LBS | BODY MASS INDEX: 22.66 KG/M2

## 2024-05-21 DIAGNOSIS — Z12.4 PAPANICOLAOU SMEAR FOR CERVICAL CANCER SCREENING: Primary | ICD-10-CM

## 2024-05-21 DIAGNOSIS — Z12.31 ENCOUNTER FOR SCREENING MAMMOGRAM FOR BREAST CANCER: ICD-10-CM

## 2024-05-21 DIAGNOSIS — N76.0 VAGINITIS AND VULVOVAGINITIS: ICD-10-CM

## 2024-05-21 PROCEDURE — 87591 N.GONORRHOEAE DNA AMP PROB: CPT | Performed by: OBSTETRICS & GYNECOLOGY

## 2024-05-21 PROCEDURE — 87491 CHLMYD TRACH DNA AMP PROBE: CPT | Performed by: OBSTETRICS & GYNECOLOGY

## 2024-05-21 PROCEDURE — 99396 PREV VISIT EST AGE 40-64: CPT | Performed by: OBSTETRICS & GYNECOLOGY

## 2024-05-21 PROCEDURE — 87624 HPV HI-RISK TYP POOLED RSLT: CPT | Performed by: OBSTETRICS & GYNECOLOGY

## 2024-05-21 PROCEDURE — 88175 CYTOPATH C/V AUTO FLUID REDO: CPT | Performed by: OBSTETRICS & GYNECOLOGY

## 2024-05-21 RX ORDER — PROGESTERONE 200 MG/1
200 CAPSULE ORAL AS DIRECTED
Qty: 90 CAPSULE | Refills: 5 | Status: SHIPPED | OUTPATIENT
Start: 2024-05-21

## 2024-05-21 NOTE — TELEPHONE ENCOUNTER
Verified pt name and info on swab and order sheet. Made copy of ID and insurance card placed in bag along with swab. Wrote tracking number on copy and place in binder.
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
## 2024-05-22 LAB
C TRACH DNA SPEC QL NAA+PROBE: NEGATIVE
N GONORRHOEA DNA SPEC QL NAA+PROBE: NEGATIVE

## 2024-05-23 ENCOUNTER — TELEPHONE (OUTPATIENT)
Facility: CLINIC | Age: 54
End: 2024-05-23

## 2024-05-24 ENCOUNTER — TELEPHONE (OUTPATIENT)
Facility: CLINIC | Age: 54
End: 2024-05-24

## 2024-05-24 DIAGNOSIS — N76.0 BACTERIAL VAGINAL INFECTION: ICD-10-CM

## 2024-05-24 DIAGNOSIS — B96.89 BACTERIAL VAGINAL INFECTION: ICD-10-CM

## 2024-05-24 DIAGNOSIS — B96.89 BACTERIAL VAGINOSIS: Primary | ICD-10-CM

## 2024-05-24 DIAGNOSIS — N76.0 BACTERIAL VAGINOSIS: Primary | ICD-10-CM

## 2024-05-24 RX ORDER — FLUCONAZOLE 150 MG/1
150 TABLET ORAL
Qty: 3 TABLET | Refills: 0 | Status: SHIPPED | OUTPATIENT
Start: 2024-05-24 | End: 2024-05-31

## 2024-05-24 RX ORDER — METRONIDAZOLE 500 MG/1
500 TABLET ORAL 2 TIMES DAILY
Qty: 14 TABLET | Refills: 0 | Status: CANCELLED | OUTPATIENT
Start: 2024-05-24 | End: 2024-05-31

## 2024-05-24 RX ORDER — METRONIDAZOLE 500 MG/1
500 TABLET ORAL 2 TIMES DAILY
Qty: 14 TABLET | Refills: 0 | Status: SHIPPED | OUTPATIENT
Start: 2024-05-24 | End: 2024-05-31

## 2024-05-24 RX ORDER — AMOXICILLIN AND CLAVULANATE POTASSIUM 875; 125 MG/1; MG/1
1 TABLET, FILM COATED ORAL 2 TIMES DAILY
Qty: 14 TABLET | Refills: 0 | Status: SHIPPED | OUTPATIENT
Start: 2024-05-24 | End: 2024-05-31

## 2024-05-24 NOTE — TELEPHONE ENCOUNTER
Discussed vikor result and treatment medication at length. pt wanted to know: Why does  think this same medication: Metronidazole will help get rid of her BV. She was tx with same medication in March. In May tested negative for BV and positive with Vikor 5/24/24. Option for pt if she wanted to request for alternate RX or schedule an appt for further discussion. Per pt she just wants providers thought process, will route and call pt. Patient verbalized understanding, agreed to and intend to comply with plan of care.

## 2024-05-24 NOTE — TELEPHONE ENCOUNTER
Spoke with patient. Discussed pap smear & vaginal ID still pending. Gonorrhea/chlamydia negative. Explained vaginal ID can take 7-10 days for results. Patient with vaginal itching. She would like Diflucan to help with the itching while waiting for the vaginal ID results.    Also was prescribed progesterone at her recent visit. She is currently on the estrogen patch and was prescribed progesterone. Patient had a supracervical hysterectomy and wants to know if it is safe to take the progesterone with a supracervical.      Aware I will get a message to Dr. Bess and call with recommendations. Verbalized understanding.

## 2024-05-25 LAB
.: NORMAL
.: NORMAL

## 2024-05-25 NOTE — TELEPHONE ENCOUNTER
OB/GYN on call    [employer] ID swab 5/21/24  Uncultured Megasphera 1 1 x 10^7 copies/uL 18.825%   BVAB2 1 x 10^7 copies/uL 18.825%   Atopobium vaginae 1 x 10^7 copies/uL 18.825%   Uncultured Megasphera 2 1 x 10^7 copies/uL 18.825%   Gardnerella vaginalis 1 x 10^7 copies/uL 18.825%   Prevotella bivia 1 x 10^6 copies/uL 1.883%   Lactobacillus crispatus 1 x 10^6 copies/uL 1.883%   Lactobacillus iners 1 x 10^6 copies/uL 1.883%   Lactobacillus jensenii 1 x 10^5 copies/uL 0.188%   Escherichia coli 1 x 10^4 copies/uL 0.019%   Enterococcus faecalis 1 x 10^4 copies/uL 0.019%     FIRST LINE:   metronidazole oral 500mg BID x 7 days (250mg TID x 7 days during pregnancy) or 0.75%     Gel: Use 1 applicatorful intravaginally qhs x 5 nights   Considerations: (BV, Prevotella)   Avoid alcohol use during and 24hrs after treatment. Caution if hepati   c impairment. May cause metallic-like taste. Often causes yeast infecti   ons.     AND   amoxicillin/clavulanate oral 875/125 mg BID x 7 days   Considerations: (AV)   Adjust dose in patients with CrCl <30 mL/minute. CrCl 10 to 30 mL/m   inute: 250 to 500 mg every 12 hours CrCl <10 mL/minute: 250 to 500   mg every 24 hours. Avoid use with penicillin allergy. Take with food.     SECOND LINE:   tinidazole oral 2g q24h x 2 doses     Considerations: (BV, Prevotella)   Give with food, adjust dose with renal impairment, caution with hepati   c impairment, avoid alcohol use during and 72 hours after tx     AND   levofloxacin oral 250-500mg qd x 5-7 days   Considerations: (AV, L iners)     Rufina was seen today for other.    Diagnoses and all orders for this visit:    Bacterial vaginosis  -     metRONIDAZOLE 500 MG Oral Tab; Take 1 tablet (500 mg total) by mouth 2 (two) times daily for 7 days. No alcohol or intercourse during treatment and for 24 hr after last dose.    Bacterial vaginal infection  -     amoxicillin clavulanate 875-125 MG Oral Tab; Take 1 tablet by mouth 2 (two) times daily  for 7 days.  -     fluconazole 150 MG Oral Tab; Take 1 tablet (150 mg total) by mouth every third day for 3 doses.       Pati Sierra MD

## 2024-05-27 LAB — HPV I/H RISK 1 DNA SPEC QL NAA+PROBE: NEGATIVE

## 2024-05-28 ENCOUNTER — TELEPHONE (OUTPATIENT)
Dept: INTERNAL MEDICINE CLINIC | Facility: CLINIC | Age: 54
End: 2024-05-28

## 2024-05-28 ENCOUNTER — TELEPHONE (OUTPATIENT)
Facility: CLINIC | Age: 54
End: 2024-05-28

## 2024-05-28 NOTE — TELEPHONE ENCOUNTER
Pt requesting a call back from nurse as she has some concerns about her OBGYN putting her on a medication (progesterone 200 MG) she believes she dose not need to be on and would like to discuss this medication. Also pt is requesting a referral to see another OBGYN.

## 2024-05-28 NOTE — TELEPHONE ENCOUNTER
Patient has multiple question regarding her plan of care, request to come in to discuss plan of care with .   Appt scheduled. Patient verbalized understanding, agreed to and intend to comply with plan of care.

## 2024-06-10 ENCOUNTER — OFFICE VISIT (OUTPATIENT)
Facility: CLINIC | Age: 54
End: 2024-06-10
Payer: COMMERCIAL

## 2024-06-10 VITALS
DIASTOLIC BLOOD PRESSURE: 87 MMHG | HEIGHT: 66 IN | SYSTOLIC BLOOD PRESSURE: 146 MMHG | BODY MASS INDEX: 21.53 KG/M2 | HEART RATE: 64 BPM | WEIGHT: 134 LBS

## 2024-06-10 DIAGNOSIS — N95.1 PERIMENOPAUSAL VASOMOTOR SYMPTOMS: ICD-10-CM

## 2024-06-10 RX ORDER — ESTRADIOL 0.05 MG/D
1 PATCH TRANSDERMAL WEEKLY
Qty: 12 PATCH | Refills: 5 | Status: SHIPPED | OUTPATIENT
Start: 2024-06-10

## 2024-06-10 RX ORDER — TRETINOIN 0.5 MG/G
1 CREAM TOPICAL NIGHTLY
Qty: 1 EACH | Refills: 5 | Status: SHIPPED | OUTPATIENT
Start: 2024-06-10

## 2024-06-10 NOTE — PROGRESS NOTES
Risk and benefits of hormone replacement were discussed.  She is prediabetic.  She wishes to have some Retin-A for acne.  Seeing a dermatologist in the past but hers is .  Her hot flashes and night sweats are getting a little better she wishes to increase her estrogen dose.

## 2024-06-12 DIAGNOSIS — G43.009 MIGRAINE WITHOUT AURA AND WITHOUT STATUS MIGRAINOSUS, NOT INTRACTABLE: ICD-10-CM

## 2024-06-13 ENCOUNTER — TELEPHONE (OUTPATIENT)
Dept: OBGYN CLINIC | Facility: CLINIC | Age: 54
End: 2024-06-13

## 2024-06-13 RX ORDER — TOPIRAMATE 100 MG/1
100 TABLET, FILM COATED ORAL 2 TIMES DAILY
Qty: 60 TABLET | Refills: 0 | Status: SHIPPED | OUTPATIENT
Start: 2024-06-13

## 2024-06-13 NOTE — TELEPHONE ENCOUNTER
Spoke with [facility] [place] pharmacist. They have questions regarding the Tretinoin Cream. They need to know where she is applying it and if we want a 20 g or 45 g tube. Will call back with providers instructions.

## 2024-06-13 NOTE — TELEPHONE ENCOUNTER
Sent the patient a [employer] message to make an appointment for further medication refills.       Medication: TOPIRAMATE 100 MG Oral Tab      Date of last refill: 10/04/2023 (#60/2)  [place]     Last office visit: 04/06/2023  Due back to clinic per last office note:  Around 07/06/2023  Date next office visit scheduled:    Future Appointments   Date Time Provider Department Center   6/13/2024  2:30 PM Maria Elena Borges, LCSW [facility]   6/27/2024  2:30 PM Maria Elena Borges, LCSW [facility]   7/9/2024  2:20 PM Paris Valdes APRN [id number]   7/11/2024  2:30 PM Maria Elena Borges, LCSW [facility]   5/27/2025  4:30 PM Anupama Bess MD [facility]           Last OV note recommendation:    ASSESSMENT/PLAN:      (G43.009) Migraine without aura and without status migrainosus, not intractable  (primary encounter diagnosis)  Plan: SUMAtriptan Succinate 100 MG Oral Tab            (R51.9) Mixed headache        Headache worsen since last visit, getting more frequent migraine headache  MRI brain done previously was negative for acute abnormality      Increase dose of Topamax slowly to 100 mg BID as tolerated  Take Sumatriptan as needed- limit to 2-3 times per week     Continue tizanidine as needed for neck muscle stiffness/tension type headaches        Maintain a headache diary and note triggers     Follow up in about 3- 6 months  See orders and medications filed with this encounter. The patient indicates understanding of these issues and agrees with the plan.           Fabiana Hardwick MD  [facility]

## 2024-06-25 ENCOUNTER — TELEPHONE (OUTPATIENT)
Facility: CLINIC | Age: 54
End: 2024-06-25

## 2024-06-25 NOTE — TELEPHONE ENCOUNTER
Patient developed an allergic reaction after switching her patch from bi-weekly to weekly, her skin is itching a lot, and the patient is hoping to go back to bi-weekly application again. Please advise

## 2024-06-25 NOTE — TELEPHONE ENCOUNTER
Per pt she thinks she's having an allergic reaction after switching her patch from bi-weekly to weekly, her skin is itching a lot, and the patient is hoping to go back to bi-weekly application again.     She's allg to adhesive.     Request pended to . Patient verbalized understanding, agreed to and intend to comply with plan of care.

## 2024-07-01 RX ORDER — ESTRADIOL 0.05 MG/D
PATCH TRANSDERMAL
Qty: 12 PATCH | Refills: 5 | Status: SHIPPED | OUTPATIENT
Start: 2024-07-01

## 2024-07-09 DIAGNOSIS — G43.009 MIGRAINE WITHOUT AURA AND WITHOUT STATUS MIGRAINOSUS, NOT INTRACTABLE: ICD-10-CM

## 2024-07-10 RX ORDER — TOPIRAMATE 100 MG/1
100 TABLET, FILM COATED ORAL 2 TIMES DAILY
Qty: 60 TABLET | Refills: 0 | Status: SHIPPED | OUTPATIENT
Start: 2024-07-10

## 2024-07-10 NOTE — TELEPHONE ENCOUNTER
Sent the patient a [employer] message to make an appointment for further medication refills.         Medication: TOPIRAMATE 100 MG Oral Tab      Date of last refill: 06/13/2024 (#60/0)  [place]     Last office visit: 04/06/2023  Due back to clinic per last office note:  3-6 months  Date next office visit scheduled:    Future Appointments   Date Time Provider Department Center   7/11/2024  2:30 PM Maria Elena Borges LCSW [facility]   7/18/2024  2:30 PM Maria Elena Borges LCSW [facility]   7/25/2024  2:30 PM Maria Elena Borges LCSW [facility]   7/26/2024  1:20 PM Paris Valdes APRN [id number]   8/1/2024  2:30 PM Maria Elena Borges LCSW [facility]   5/27/2025  4:30 PM Anupama Bess MD [facility]           Last OV note recommendation:    ASSESSMENT/PLAN:      (G43.009) Migraine without aura and without status migrainosus, not intractable  (primary encounter diagnosis)  Plan: SUMAtriptan Succinate 100 MG Oral Tab            (R51.9) Mixed headache        Headache worsen since last visit, getting more frequent migraine headache  MRI brain done previously was negative for acute abnormality      Increase dose of Topamax slowly to 100 mg BID as tolerated  Take Sumatriptan as needed- limit to 2-3 times per week     Continue tizanidine as needed for neck muscle stiffness/tension type headaches        Maintain a headache diary and note triggers     Follow up in about 3- 6 months  See orders and medications filed with this encounter. The patient indicates understanding of these issues and agrees with the plan.           Fabiana Hardwick MD  [facility]

## 2024-07-23 ENCOUNTER — TELEPHONE (OUTPATIENT)
Facility: CLINIC | Age: 54
End: 2024-07-23

## 2024-07-23 RX ORDER — ESTRADIOL 0.04 MG/D
1 PATCH, EXTENDED RELEASE TRANSDERMAL
Qty: 12 EACH | Refills: 5 | Status: SHIPPED | OUTPATIENT
Start: 2024-07-25 | End: 2024-07-26

## 2024-07-23 NOTE — TELEPHONE ENCOUNTER
Patient was given  a prescription for Estrodiol. The pharmacy states that the directions are contradicting.  It says 1 patch weekly and 1 patch bi weekly.

## 2024-07-23 NOTE — TELEPHONE ENCOUNTER
Poke with patient. Weekly estradiol patch was sent to the pharmacy. Directions say change patch biweekly. As discussed at her visit, she is to use biweekly. New order pended. Patient will check pharmacy later today. Verbalized understanding.

## 2024-07-26 ENCOUNTER — TELEPHONE (OUTPATIENT)
Dept: OBGYN CLINIC | Facility: CLINIC | Age: 54
End: 2024-07-26

## 2024-07-26 RX ORDER — ESTRADIOL 0.05 MG/D
1 PATCH TRANSDERMAL
Qty: 30 PATCH | Refills: 3 | Status: SHIPPED | OUTPATIENT
Start: 2024-07-26

## 2024-07-26 RX ORDER — ESTRADIOL 0.05 MG/D
1 PATCH TRANSDERMAL
Qty: 10 PATCH | Refills: 5 | Status: SHIPPED | OUTPATIENT
Start: 2024-07-26 | End: 2024-07-26

## 2024-07-26 NOTE — TELEPHONE ENCOUNTER
[facility] calling, patient does not want to  prescription because the name of it is incorrect, states it should read 'estradiol 0.05 MG/24HR Transdermal Patch Bi-Weekly' instead of weekly. Directions are correct, stating patient should place one patch twice weekly. Please update name and re-submit to [facility].

## 2024-07-26 NOTE — TELEPHONE ENCOUNTER
Patient calling stating someone could of killed her by not giving her the estradiol twice weekly.   Please advise as patient says this is extremely urgent.

## 2024-07-26 NOTE — TELEPHONE ENCOUNTER
Patient called in after contacting her pharmacy. Pharmacy states  her order for the estradiol 0.05 MG/24HR Transdermal Patch Weekly needs to be ordered as Bi-weekly.

## 2024-07-29 NOTE — TELEPHONE ENCOUNTER
JAMAICA Guaman spoke with patient to verify medications.     Rowena called estrogen prescription in to [facility] on Friday.

## 2024-07-30 ENCOUNTER — TELEPHONE (OUTPATIENT)
Dept: INTERNAL MEDICINE CLINIC | Facility: CLINIC | Age: 54
End: 2024-07-30

## 2024-08-05 ENCOUNTER — MED REC SCAN ONLY (OUTPATIENT)
Dept: INTERNAL MEDICINE CLINIC | Facility: CLINIC | Age: 54
End: 2024-08-05
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
## 2024-08-30 DIAGNOSIS — I73.00 RAYNAUD'S DISEASE WITHOUT GANGRENE: ICD-10-CM

## 2024-09-03 ENCOUNTER — HOSPITAL ENCOUNTER (OUTPATIENT)
Age: 54
Discharge: HOME | End: 2024-09-03
Payer: COMMERCIAL

## 2024-09-03 ENCOUNTER — APPOINTMENT (OUTPATIENT)
Dept: GENERAL RADIOLOGY | Age: 54
End: 2024-09-03
Attending: PHYSICIAN ASSISTANT
Payer: COMMERCIAL

## 2024-09-03 VITALS
SYSTOLIC BLOOD PRESSURE: 140 MMHG | HEIGHT: 66 IN | HEART RATE: 105 BPM | OXYGEN SATURATION: 100 % | WEIGHT: 130 LBS | BODY MASS INDEX: 20.89 KG/M2 | DIASTOLIC BLOOD PRESSURE: 92 MMHG | TEMPERATURE: 98 F | RESPIRATION RATE: 16 BRPM

## 2024-09-03 DIAGNOSIS — S60.222A CONTUSION OF LEFT HAND, INITIAL ENCOUNTER: ICD-10-CM

## 2024-09-03 DIAGNOSIS — S62.637A CLOSED DISPLACED FRACTURE OF DISTAL PHALANX OF LEFT LITTLE FINGER, INITIAL ENCOUNTER: Primary | ICD-10-CM

## 2024-09-03 PROCEDURE — 73130 X-RAY EXAM OF HAND: CPT | Performed by: PHYSICIAN ASSISTANT

## 2024-09-03 PROCEDURE — 26750 TREAT FINGER FRACTURE EACH: CPT

## 2024-09-03 PROCEDURE — 99214 OFFICE O/P EST MOD 30 MIN: CPT

## 2024-09-03 PROCEDURE — 99213 OFFICE O/P EST LOW 20 MIN: CPT

## 2024-09-03 RX ORDER — NIFEDIPINE 30 MG
30 TABLET, EXTENDED RELEASE ORAL DAILY
Qty: 90 TABLET | Refills: 3 | Status: SHIPPED | OUTPATIENT
Start: 2024-09-03

## 2024-09-03 NOTE — TELEPHONE ENCOUNTER
LOV: 4/25/2024 with Dr. Jewell  RTC: 12 months  Last Relevant Labs: 4/25/2024  Filled: 8/11/2023    #90 with 1 refill    Future Appointments   Date Time Provider Department Center   9/5/2024  2:30 PM Kenneavy, Maria Elena, LCSW [facility]   5/27/2025  4:30 PM Anupama Bess MD [facility]

## 2024-09-04 ENCOUNTER — TELEPHONE (OUTPATIENT)
Dept: ORTHOPEDICS CLINIC | Facility: CLINIC | Age: 54
End: 2024-09-04

## 2024-09-04 DIAGNOSIS — M79.642 LEFT HAND PAIN: Primary | ICD-10-CM

## 2024-09-04 NOTE — TELEPHONE ENCOUNTER
Patient went to the IC and patient has an Intra-articular fracture dorsum of the base of the distal phalanx of the 5th finger with displacement of the fracture fragment dorsally in relation to the DIP joint on her left hand.  Please advise who can see her.
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
## 2024-09-05 ENCOUNTER — OFFICE VISIT (OUTPATIENT)
Dept: INTERNAL MEDICINE CLINIC | Facility: CLINIC | Age: 54
End: 2024-09-05
Payer: COMMERCIAL

## 2024-09-05 VITALS
RESPIRATION RATE: 16 BRPM | WEIGHT: 132.63 LBS | DIASTOLIC BLOOD PRESSURE: 88 MMHG | BODY MASS INDEX: 21.32 KG/M2 | TEMPERATURE: 98 F | SYSTOLIC BLOOD PRESSURE: 126 MMHG | HEART RATE: 103 BPM | HEIGHT: 66 IN | OXYGEN SATURATION: 98 %

## 2024-09-05 DIAGNOSIS — Z53.8 APPOINTMENT CANCELED BY HOSPITAL: Primary | ICD-10-CM

## 2024-09-05 NOTE — PROGRESS NOTES
Appointment cancelled - patient already has appointment with Orthopedics for finger fracture.  Advised patient to prince tape fingers, continue with finger brace.  Advised her to have someone else care for her dogs over the weekend until her Ortho appointment as she states dogs keep running into/accidentally hitting her finger.

## 2024-09-08 ENCOUNTER — HOSPITAL ENCOUNTER (OUTPATIENT)
Dept: GENERAL RADIOLOGY | Age: 54
Discharge: HOME | End: 2024-09-08
Attending: PHYSICIAN ASSISTANT
Payer: COMMERCIAL

## 2024-09-08 ENCOUNTER — HOSPITAL ENCOUNTER (OUTPATIENT)
Dept: GENERAL RADIOLOGY | Age: 54
End: 2024-09-08
Attending: PHYSICIAN ASSISTANT
Payer: COMMERCIAL

## 2024-09-08 DIAGNOSIS — M79.642 LEFT HAND PAIN: ICD-10-CM

## 2024-09-08 PROCEDURE — 73130 X-RAY EXAM OF HAND: CPT | Performed by: PHYSICIAN ASSISTANT

## 2024-09-09 ENCOUNTER — OFFICE VISIT (OUTPATIENT)
Dept: ORTHOPEDICS CLINIC | Facility: CLINIC | Age: 54
End: 2024-09-09
Payer: COMMERCIAL

## 2024-09-09 VITALS — BODY MASS INDEX: 21.21 KG/M2 | HEIGHT: 66 IN | WEIGHT: 132 LBS

## 2024-09-09 DIAGNOSIS — M20.012 MALLET FINGER OF LEFT HAND: Primary | ICD-10-CM

## 2024-09-09 PROCEDURE — 99204 OFFICE O/P NEW MOD 45 MIN: CPT | Performed by: ORTHOPAEDIC SURGERY
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
## 2024-09-11 ENCOUNTER — TELEPHONE (OUTPATIENT)
Dept: INTERNAL MEDICINE CLINIC | Facility: CLINIC | Age: 54
End: 2024-09-11

## 2024-09-11 DIAGNOSIS — M20.019 MALLET DEFORMITY OF LITTLE FINGER: ICD-10-CM

## 2024-09-11 DIAGNOSIS — M20.011 MALLET FINGER OF RIGHT HAND: Primary | ICD-10-CM

## 2024-09-11 NOTE — TELEPHONE ENCOUNTER
Patient requesting referral: KATIE LANCE     Is insurance accurate in Epic and Verified: NO  Is there already an open/pending/approved referral: YES, REQUESTING 2ND OPINION     Specialty: ORTHOPEDIC SURGEON   Refer to Provider (Physician's first and last name - referral needs to be under collaborating MD's name):   PCP RECOMMENDATION      Diagnosis or reason they are being seen: Mallet finger of left hand (M20.012)     If Requesting Out of Network Provider, please provide reason: NO    Patient Call Back Number:

## 2024-09-11 NOTE — TELEPHONE ENCOUNTER
Patient requesting ortho referral for mallet finger of left small finger.     Last OV - 4/25/24    Ortho referral pended. Please sign if appropriate.

## 2024-09-11 NOTE — TELEPHONE ENCOUNTER
Orthopedics referral signed/entered for Pinky/Dr. Rob Rivas  [address] [place] [id number] [phone]

## 2024-09-25 ENCOUNTER — TELEPHONE (OUTPATIENT)
Dept: PHYSICAL THERAPY | Facility: HOSPITAL | Age: 54
End: 2024-09-25

## 2024-09-26 ENCOUNTER — APPOINTMENT (OUTPATIENT)
Dept: OCCUPATIONAL MEDICINE | Age: 54
End: 2024-09-26
Attending: ORTHOPAEDIC SURGERY
Payer: COMMERCIAL

## 2024-09-26 ENCOUNTER — TELEPHONE (OUTPATIENT)
Dept: PHYSICAL THERAPY | Facility: HOSPITAL | Age: 54
End: 2024-09-26

## 2024-09-30 ENCOUNTER — APPOINTMENT (OUTPATIENT)
Dept: OCCUPATIONAL MEDICINE | Age: 54
End: 2024-09-30
Attending: ORTHOPAEDIC SURGERY
Payer: COMMERCIAL

## 2024-10-04 ENCOUNTER — APPOINTMENT (OUTPATIENT)
Dept: OCCUPATIONAL MEDICINE | Age: 54
End: 2024-10-04
Attending: ORTHOPAEDIC SURGERY
Payer: COMMERCIAL

## 2024-10-08 ENCOUNTER — APPOINTMENT (OUTPATIENT)
Dept: OCCUPATIONAL MEDICINE | Age: 54
End: 2024-10-08
Attending: ORTHOPAEDIC SURGERY
Payer: COMMERCIAL

## 2024-10-15 ENCOUNTER — APPOINTMENT (OUTPATIENT)
Dept: OCCUPATIONAL MEDICINE | Age: 54
End: 2024-10-15
Attending: ORTHOPAEDIC SURGERY
Payer: COMMERCIAL

## 2024-10-17 ENCOUNTER — OFFICE VISIT (OUTPATIENT)
Facility: CLINIC | Age: 54
End: 2024-10-17
Payer: COMMERCIAL

## 2024-10-17 ENCOUNTER — APPOINTMENT (OUTPATIENT)
Dept: OCCUPATIONAL MEDICINE | Age: 54
End: 2024-10-17
Attending: ORTHOPAEDIC SURGERY
Payer: COMMERCIAL

## 2024-10-17 ENCOUNTER — TELEPHONE (OUTPATIENT)
Facility: CLINIC | Age: 54
End: 2024-10-17

## 2024-10-17 VITALS
WEIGHT: 138 LBS | HEIGHT: 66 IN | BODY MASS INDEX: 22.18 KG/M2 | SYSTOLIC BLOOD PRESSURE: 136 MMHG | DIASTOLIC BLOOD PRESSURE: 66 MMHG | HEART RATE: 105 BPM

## 2024-10-17 DIAGNOSIS — Z01.419 WELL WOMAN EXAM WITH ROUTINE GYNECOLOGICAL EXAM: Primary | ICD-10-CM

## 2024-10-17 DIAGNOSIS — Z12.4 PAPANICOLAOU SMEAR FOR CERVICAL CANCER SCREENING: Primary | ICD-10-CM

## 2024-10-17 DIAGNOSIS — Z12.31 ENCOUNTER FOR SCREENING MAMMOGRAM FOR BREAST CANCER: ICD-10-CM

## 2024-10-17 DIAGNOSIS — Z01.419 WELL WOMAN EXAM WITH ROUTINE GYNECOLOGICAL EXAM: ICD-10-CM

## 2024-10-17 DIAGNOSIS — Z23 NEED FOR VACCINATION: ICD-10-CM

## 2024-10-17 PROCEDURE — 99213 OFFICE O/P EST LOW 20 MIN: CPT | Performed by: OBSTETRICS & GYNECOLOGY

## 2024-10-17 PROCEDURE — 90471 IMMUNIZATION ADMIN: CPT | Performed by: OBSTETRICS & GYNECOLOGY

## 2024-10-17 PROCEDURE — 90656 IIV3 VACC NO PRSV 0.5 ML IM: CPT | Performed by: OBSTETRICS & GYNECOLOGY

## 2024-10-17 RX ORDER — TRETINOIN 0.1 MG/G
1 GEL TOPICAL NIGHTLY
Qty: 1 EACH | Refills: 3 | Status: SHIPPED | OUTPATIENT
Start: 2024-10-17

## 2024-10-17 RX ORDER — TRETINOIN 0.5 MG/G
1 CREAM TOPICAL NIGHTLY
Qty: 1 EACH | Refills: 5 | Status: SHIPPED | OUTPATIENT
Start: 2024-10-17

## 2024-10-17 RX ORDER — ESTRADIOL 0.1 MG/D
1 PATCH TRANSDERMAL WEEKLY
Qty: 12 PATCH | Refills: 5 | Status: SHIPPED | OUTPATIENT
Start: 2024-10-17

## 2024-10-17 NOTE — PROGRESS NOTES
She has a little bit of a skin reaction to the patches she is on now she wishes to go to a weekly patch.  Gels and creams were offered she does not want to go that way.  She is having improved hot flashes still having night sweats.  Still having some dryness vaginally we will do a flu shot today.  She wishes to have her Retin-A dose increased

## 2024-10-17 NOTE — TELEPHONE ENCOUNTER
Pharmacy called to get confirmation on medication prescribed. They would like to know if they should fill both of the following:    Tretinoin 0.05 % External Cream   Tretinoin 0.01 % External Gel     Routed to Dr. Bess to provide clarification.

## 2024-10-18 RX ORDER — TRETINOIN 1 MG/G
1 CREAM TOPICAL NIGHTLY
Qty: 45 G | Refills: 5 | Status: SHIPPED | OUTPATIENT
Start: 2024-10-18

## 2024-10-18 NOTE — TELEPHONE ENCOUNTER
Spoke with Pharmacy. Advised the last prescription sent over was correct: Tretinoin 0.1 % External Cream. Understanding verbalized.

## 2024-10-18 NOTE — TELEPHONE ENCOUNTER
Per pharmacy, patient is expecting Tretinoin 0.1 % External Cream, not 0.01 or 0.05%. Per Chart review from visit notes on 10/17/24, dose was to be increased. Additionally, pharmacy needs prescription to state how many grams patient is to use per application and the site of application.   Will verify with provider and pend new prescription as needed.

## 2024-10-22 ENCOUNTER — APPOINTMENT (OUTPATIENT)
Dept: OCCUPATIONAL MEDICINE | Age: 54
End: 2024-10-22
Attending: ORTHOPAEDIC SURGERY
Payer: COMMERCIAL

## 2024-10-24 ENCOUNTER — APPOINTMENT (OUTPATIENT)
Dept: OCCUPATIONAL MEDICINE | Age: 54
End: 2024-10-24
Attending: ORTHOPAEDIC SURGERY
Payer: COMMERCIAL

## 2024-10-28 ENCOUNTER — LAB ENCOUNTER (OUTPATIENT)
Dept: LAB | Age: 54
End: 2024-10-28
Attending: INTERNAL MEDICINE
Payer: COMMERCIAL

## 2024-10-28 ENCOUNTER — HOSPITAL ENCOUNTER (OUTPATIENT)
Dept: MAMMOGRAPHY | Age: 54
Discharge: HOME | End: 2024-10-28
Attending: OBSTETRICS & GYNECOLOGY
Payer: COMMERCIAL

## 2024-10-28 DIAGNOSIS — Z01.419 WELL WOMAN EXAM WITH ROUTINE GYNECOLOGICAL EXAM: ICD-10-CM

## 2024-10-28 DIAGNOSIS — Z12.31 ENCOUNTER FOR SCREENING MAMMOGRAM FOR BREAST CANCER: ICD-10-CM

## 2024-10-28 LAB
ALBUMIN SERPL-MCNC: 4.2 G/DL (ref 3.2–4.8)
ALBUMIN/GLOB SERPL: 1.4 {RATIO} (ref 1–2)
ALP LIVER SERPL-CCNC: 91 U/L
ALT SERPL-CCNC: 20 U/L
ANION GAP SERPL CALC-SCNC: 5 MMOL/L (ref 0–18)
AST SERPL-CCNC: 16 U/L (ref ?–34)
BASOPHILS # BLD AUTO: 0.05 X10(3) UL (ref 0–0.2)
BASOPHILS NFR BLD AUTO: 0.5 %
BILIRUB SERPL-MCNC: 0.4 MG/DL (ref 0.3–1.2)
BUN BLD-MCNC: 11 MG/DL (ref 9–23)
CALCIUM BLD-MCNC: 10.2 MG/DL (ref 8.7–10.4)
CHLORIDE SERPL-SCNC: 105 MMOL/L (ref 98–112)
CHOLEST SERPL-MCNC: 183 MG/DL (ref ?–200)
CO2 SERPL-SCNC: 27 MMOL/L (ref 21–32)
CREAT BLD-MCNC: 1.03 MG/DL
EGFRCR SERPLBLD CKD-EPI 2021: 65 ML/MIN/1.73M2 (ref 60–?)
EOSINOPHIL # BLD AUTO: 0.03 X10(3) UL (ref 0–0.7)
EOSINOPHIL NFR BLD AUTO: 0.3 %
ERYTHROCYTE [DISTWIDTH] IN BLOOD BY AUTOMATED COUNT: 14.4 %
FASTING PATIENT LIPID ANSWER: NO
FASTING STATUS PATIENT QL REPORTED: NO
GLOBULIN PLAS-MCNC: 2.9 G/DL (ref 2–3.5)
GLUCOSE BLD-MCNC: 97 MG/DL (ref 70–99)
HCT VFR BLD AUTO: 36.4 %
HDLC SERPL-MCNC: 90 MG/DL (ref 40–59)
HGB BLD-MCNC: 12.1 G/DL
IMM GRANULOCYTES # BLD AUTO: 0.02 X10(3) UL (ref 0–1)
IMM GRANULOCYTES NFR BLD: 0.2 %
LDLC SERPL CALC-MCNC: 77 MG/DL (ref ?–100)
LYMPHOCYTES # BLD AUTO: 1.97 X10(3) UL (ref 1–4)
LYMPHOCYTES NFR BLD AUTO: 20.8 %
MCH RBC QN AUTO: 29.1 PG (ref 26–34)
MCHC RBC AUTO-ENTMCNC: 33.2 G/DL (ref 31–37)
MCV RBC AUTO: 87.5 FL
MONOCYTES # BLD AUTO: 0.7 X10(3) UL (ref 0.1–1)
MONOCYTES NFR BLD AUTO: 7.4 %
NEUTROPHILS # BLD AUTO: 6.69 X10 (3) UL (ref 1.5–7.7)
NEUTROPHILS # BLD AUTO: 6.69 X10(3) UL (ref 1.5–7.7)
NEUTROPHILS NFR BLD AUTO: 70.8 %
NONHDLC SERPL-MCNC: 93 MG/DL (ref ?–130)
OSMOLALITY SERPL CALC.SUM OF ELEC: 283 MOSM/KG (ref 275–295)
PLATELET # BLD AUTO: 287 10(3)UL (ref 150–450)
POTASSIUM SERPL-SCNC: 3.4 MMOL/L (ref 3.5–5.1)
PROT SERPL-MCNC: 7.1 G/DL (ref 5.7–8.2)
RBC # BLD AUTO: 4.16 X10(6)UL
SODIUM SERPL-SCNC: 137 MMOL/L (ref 136–145)
TRIGL SERPL-MCNC: 92 MG/DL (ref 30–149)
TSI SER-ACNC: 1.16 MIU/ML (ref 0.55–4.78)
VLDLC SERPL CALC-MCNC: 14 MG/DL (ref 0–30)
WBC # BLD AUTO: 9.5 X10(3) UL (ref 4–11)

## 2024-10-28 PROCEDURE — 80053 COMPREHEN METABOLIC PANEL: CPT

## 2024-10-28 PROCEDURE — 36415 COLL VENOUS BLD VENIPUNCTURE: CPT

## 2024-10-28 PROCEDURE — 85025 COMPLETE CBC W/AUTO DIFF WBC: CPT

## 2024-10-28 PROCEDURE — 84443 ASSAY THYROID STIM HORMONE: CPT

## 2024-10-28 PROCEDURE — 80061 LIPID PANEL: CPT

## 2024-10-29 NOTE — PROGRESS NOTES
Patient aware of 's recommendation: \"F/u with primary for recurrent lo potasium.\" Patient verbalized understanding, agreed to and intend to comply with plan of care.

## 2024-12-06 ENCOUNTER — PATIENT MESSAGE (OUTPATIENT)
Dept: INTERNAL MEDICINE CLINIC | Facility: CLINIC | Age: 54
End: 2024-12-06

## 2024-12-06 DIAGNOSIS — J30.9 CHRONIC ALLERGIC RHINITIS: ICD-10-CM

## 2024-12-06 RX ORDER — IPRATROPIUM BROMIDE 21 UG/1
1 SPRAY, METERED NASAL DAILY
Qty: 30 ML | Refills: 3 | Status: SHIPPED | OUTPATIENT
Start: 2024-12-06

## 2024-12-11 ENCOUNTER — HOSPITAL ENCOUNTER (OUTPATIENT)
Age: 54
Discharge: HOME | End: 2024-12-11
Attending: EMERGENCY MEDICINE
Payer: COMMERCIAL

## 2024-12-11 ENCOUNTER — APPOINTMENT (OUTPATIENT)
Dept: GENERAL RADIOLOGY | Age: 54
End: 2024-12-11
Attending: EMERGENCY MEDICINE
Payer: COMMERCIAL

## 2024-12-11 VITALS
SYSTOLIC BLOOD PRESSURE: 186 MMHG | HEART RATE: 110 BPM | OXYGEN SATURATION: 98 % | BODY MASS INDEX: 22 KG/M2 | DIASTOLIC BLOOD PRESSURE: 94 MMHG | WEIGHT: 136.69 LBS | TEMPERATURE: 98 F | RESPIRATION RATE: 20 BRPM

## 2024-12-11 DIAGNOSIS — S80.01XA CONTUSION OF RIGHT KNEE AND LOWER LEG, INITIAL ENCOUNTER: Primary | ICD-10-CM

## 2024-12-11 DIAGNOSIS — S80.11XA CONTUSION OF RIGHT KNEE AND LOWER LEG, INITIAL ENCOUNTER: Primary | ICD-10-CM

## 2024-12-11 PROCEDURE — 73590 X-RAY EXAM OF LOWER LEG: CPT | Performed by: EMERGENCY MEDICINE

## 2024-12-11 PROCEDURE — 99214 OFFICE O/P EST MOD 30 MIN: CPT

## 2024-12-11 PROCEDURE — 99213 OFFICE O/P EST LOW 20 MIN: CPT

## 2024-12-12 NOTE — ED INITIAL ASSESSMENT (HPI)
Pt tripped over a pet gate last week falling onto hardwood floor, pt has bruising to R shin area, L 5th finger injury and back pain
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
## 2025-01-02 PROBLEM — F31.31 BIPOLAR AFFECTIVE DISORDER, CURRENTLY DEPRESSED, MILD (HCC): Status: ACTIVE | Noted: 2025-01-02

## 2025-01-02 PROBLEM — F43.12 NIGHTMARES ASSOCIATED WITH CHRONIC POST-TRAUMATIC STRESS DISORDER: Status: ACTIVE | Noted: 2025-01-02

## 2025-01-02 PROBLEM — F51.5 NIGHTMARES ASSOCIATED WITH CHRONIC POST-TRAUMATIC STRESS DISORDER: Status: ACTIVE | Noted: 2025-01-02

## 2025-05-27 ENCOUNTER — OFFICE VISIT (OUTPATIENT)
Facility: CLINIC | Age: 55
End: 2025-05-27
Payer: COMMERCIAL

## 2025-05-27 VITALS
SYSTOLIC BLOOD PRESSURE: 130 MMHG | WEIGHT: 137.63 LBS | BODY MASS INDEX: 22.12 KG/M2 | HEART RATE: 114 BPM | HEIGHT: 66 IN | DIASTOLIC BLOOD PRESSURE: 80 MMHG

## 2025-05-27 DIAGNOSIS — Z12.31 ENCOUNTER FOR SCREENING MAMMOGRAM FOR BREAST CANCER: Primary | ICD-10-CM

## 2025-05-27 PROCEDURE — 99396 PREV VISIT EST AGE 40-64: CPT | Performed by: OBSTETRICS & GYNECOLOGY

## 2025-05-27 RX ORDER — ESTRADIOL 0.1 MG/D
1 PATCH TRANSDERMAL WEEKLY
Qty: 12 PATCH | Refills: 5 | Status: SHIPPED | OUTPATIENT
Start: 2025-05-27

## 2025-05-27 RX ORDER — ESTRADIOL 0.05 MG/D
1 PATCH TRANSDERMAL
Qty: 30 PATCH | Refills: 3 | Status: SHIPPED | OUTPATIENT
Start: 2025-05-27

## 2025-05-27 RX ORDER — PROGESTERONE 200 MG/1
200 CAPSULE ORAL AS DIRECTED
Qty: 90 CAPSULE | Refills: 5 | Status: SHIPPED | OUTPATIENT
Start: 2025-05-27
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
## 2025-05-28 ENCOUNTER — TELEPHONE (OUTPATIENT)
Facility: CLINIC | Age: 55
End: 2025-05-28

## 2025-05-28 DIAGNOSIS — N95.8 GENITOURINARY SYNDROME OF MENOPAUSE: ICD-10-CM

## 2025-05-28 NOTE — TELEPHONE ENCOUNTER
[place] pharmacy calling to verify which strength of Estradiol patch patient should be taking. Prescription was sent for 2 different doses with different directions. Message routed to provider for clarification.

## 2025-05-29 RX ORDER — ESTRADIOL 0.1 MG/G
CREAM VAGINAL
Qty: 42.5 G | Refills: 0 | OUTPATIENT
Start: 2025-05-29

## 2025-05-30 ENCOUNTER — TELEPHONE (OUTPATIENT)
Facility: CLINIC | Age: 55
End: 2025-05-30

## 2025-05-30 RX ORDER — ESTRADIOL 0.1 MG/G
1 CREAM VAGINAL
Qty: 42.5 G | Refills: 1 | Status: SHIPPED | OUTPATIENT
Start: 2025-05-30

## 2025-05-30 NOTE — TELEPHONE ENCOUNTER
Pt had [facility] 05/27/2025, she is looking for new Rx for Estradiol cream. She received everything else.

## 2025-05-30 NOTE — TELEPHONE ENCOUNTER
Poke with patient. She is requesting a refill on her estradiol cream. Patient uses both cream & transdermal patch weekly. Order routed for review. Patient would like it sent to [place]. Verbalized understanding.

## 2025-06-10 ENCOUNTER — TELEPHONE (OUTPATIENT)
Facility: CLINIC | Age: 55
End: 2025-06-10

## 2025-06-10 DIAGNOSIS — N95.8 GENITOURINARY SYNDROME OF MENOPAUSE: Primary | ICD-10-CM

## 2025-06-10 RX ORDER — ESTERIFIED ESTROGEN AND METHYLTESTOSTERONE .625; 1.25 MG/1; MG/1
1 TABLET ORAL DAILY
Qty: 90 TABLET | Refills: 3 | Status: SHIPPED | OUTPATIENT
Start: 2025-06-10

## 2025-06-10 NOTE — TELEPHONE ENCOUNTER
56 yo request for RX testosterone     Postmenopausal: difficulty focusing, sleeping problems.   She would like to include testosterone in her treatment for postmenopausal management.     5/27/25 [facility] EXAM, takes   Estradiol 0.1 vaginal cream  Progesterone 200 mg oral   Estradiol 0.05mg Transdermal    Will route to . Patient verbalized understanding, agreed to and intend to comply with plan of care.

## 2025-06-10 NOTE — TELEPHONE ENCOUNTER
Per pt she just called and she wanted to add to her hrt some testosterone medication, she was taking only the progesterone, but wants to add that. Please advise and call pt. Thanks

## 2025-06-11 NOTE — TELEPHONE ENCOUNTER
1.Patient wants to makes sure her estrogen dose is equivalent to her   patch.   2. And request to send RX Estestrogens-methyltestosterone to [facility].      Will route to . Patient verbalized understanding, agreed to and intend to comply with plan of care.

## 2025-06-13 NOTE — TELEPHONE ENCOUNTER
Patient aware of 's msg: I believe it is she can check with her pharmacy again the estrogen patch does not have any testosterone in it, stop using the patch if starting Estestrogens-methyltestosterone.   Patient can check with [facility] if they received her RX  Estestrogens-methyltestosterone and plan to use a goodrx coupon.     Patient verbalized understanding, agreed to and intend to comply with plan of care.

## 2025-06-27 ENCOUNTER — HOSPITAL ENCOUNTER (OUTPATIENT)
Dept: MAMMOGRAPHY | Age: 55
Discharge: HOME | End: 2025-06-27
Attending: OBSTETRICS & GYNECOLOGY
Payer: COMMERCIAL

## 2025-06-27 ENCOUNTER — HOSPITAL ENCOUNTER (OUTPATIENT)
Dept: MAMMOGRAPHY | Age: 55
End: 2025-06-27
Attending: OBSTETRICS & GYNECOLOGY
Payer: COMMERCIAL

## 2025-06-27 PROCEDURE — 77067 SCR MAMMO BI INCL CAD: CPT | Performed by: OBSTETRICS & GYNECOLOGY

## 2025-06-27 PROCEDURE — 77063 BREAST TOMOSYNTHESIS BI: CPT | Performed by: OBSTETRICS & GYNECOLOGY

## 2025-06-28 ENCOUNTER — PATIENT MESSAGE (OUTPATIENT)
Facility: CLINIC | Age: 55
End: 2025-06-28

## 2025-06-28 DIAGNOSIS — Z12.39 ENCOUNTER FOR BREAST CANCER SCREENING OTHER THAN MAMMOGRAM: ICD-10-CM

## 2025-06-28 DIAGNOSIS — Z12.31 ENCOUNTER FOR SCREENING MAMMOGRAM FOR MALIGNANT NEOPLASM OF BREAST: Primary | ICD-10-CM

## 2025-06-28 DIAGNOSIS — R92.30 DENSE BREAST: ICD-10-CM

## 2025-06-30 ENCOUNTER — OFFICE VISIT (OUTPATIENT)
Dept: INTERNAL MEDICINE CLINIC | Facility: CLINIC | Age: 55
End: 2025-06-30
Payer: COMMERCIAL

## 2025-06-30 ENCOUNTER — LAB ENCOUNTER (OUTPATIENT)
Dept: LAB | Age: 55
End: 2025-06-30
Attending: INTERNAL MEDICINE
Payer: COMMERCIAL

## 2025-06-30 ENCOUNTER — TELEPHONE (OUTPATIENT)
Dept: INTERNAL MEDICINE CLINIC | Facility: CLINIC | Age: 55
End: 2025-06-30

## 2025-06-30 VITALS
SYSTOLIC BLOOD PRESSURE: 170 MMHG | TEMPERATURE: 98 F | HEART RATE: 119 BPM | OXYGEN SATURATION: 98 % | WEIGHT: 140.38 LBS | HEIGHT: 65.75 IN | BODY MASS INDEX: 22.83 KG/M2 | DIASTOLIC BLOOD PRESSURE: 90 MMHG

## 2025-06-30 DIAGNOSIS — G43.009 MIGRAINE WITHOUT AURA AND WITHOUT STATUS MIGRAINOSUS, NOT INTRACTABLE: ICD-10-CM

## 2025-06-30 DIAGNOSIS — Z23 NEED FOR DIPHTHERIA-TETANUS-PERTUSSIS (TDAP) VACCINE: ICD-10-CM

## 2025-06-30 DIAGNOSIS — Z00.00 ENCOUNTER FOR PREVENTATIVE ADULT HEALTH CARE EXAMINATION: ICD-10-CM

## 2025-06-30 DIAGNOSIS — K64.8 INTERNAL HEMORRHOIDS WITHOUT COMPLICATION: ICD-10-CM

## 2025-06-30 DIAGNOSIS — E04.2 MULTINODULAR THYROID: ICD-10-CM

## 2025-06-30 DIAGNOSIS — R05.3 CHRONIC COUGH: Primary | ICD-10-CM

## 2025-06-30 DIAGNOSIS — E78.00 PURE HYPERCHOLESTEROLEMIA: ICD-10-CM

## 2025-06-30 DIAGNOSIS — G89.29 CHRONIC NECK AND BACK PAIN: Chronic | ICD-10-CM

## 2025-06-30 DIAGNOSIS — E55.9 VITAMIN D DEFICIENCY: ICD-10-CM

## 2025-06-30 DIAGNOSIS — E34.9 HORMONAL DISORDER: ICD-10-CM

## 2025-06-30 DIAGNOSIS — J30.9 CHRONIC ALLERGIC RHINITIS: ICD-10-CM

## 2025-06-30 DIAGNOSIS — I10 PRIMARY HYPERTENSION: Chronic | ICD-10-CM

## 2025-06-30 DIAGNOSIS — M54.9 CHRONIC NECK AND BACK PAIN: Chronic | ICD-10-CM

## 2025-06-30 DIAGNOSIS — Z00.00 ENCOUNTER FOR PREVENTATIVE ADULT HEALTH CARE EXAMINATION: Primary | ICD-10-CM

## 2025-06-30 DIAGNOSIS — M54.2 CHRONIC NECK AND BACK PAIN: Chronic | ICD-10-CM

## 2025-06-30 DIAGNOSIS — R14.1 ABDOMINAL GAS PAIN: ICD-10-CM

## 2025-06-30 LAB
ALBUMIN SERPL-MCNC: 4.8 G/DL (ref 3.2–4.8)
ALBUMIN/GLOB SERPL: 1.7 {RATIO} (ref 1–2)
ALP LIVER SERPL-CCNC: 95 U/L (ref 41–108)
ALT SERPL-CCNC: 25 U/L (ref 10–49)
ANION GAP SERPL CALC-SCNC: 10 MMOL/L (ref 0–18)
AST SERPL-CCNC: 27 U/L (ref ?–34)
BASOPHILS # BLD AUTO: 0.03 X10(3) UL (ref 0–0.2)
BASOPHILS NFR BLD AUTO: 0.5 %
BILIRUB SERPL-MCNC: 0.6 MG/DL (ref 0.3–1.2)
BUN BLD-MCNC: 11 MG/DL (ref 9–23)
CALCIUM BLD-MCNC: 9.7 MG/DL (ref 8.7–10.6)
CHLORIDE SERPL-SCNC: 102 MMOL/L (ref 98–112)
CHOLEST SERPL-MCNC: 219 MG/DL (ref ?–200)
CO2 SERPL-SCNC: 26 MMOL/L (ref 21–32)
CREAT BLD-MCNC: 1.16 MG/DL (ref 0.55–1.02)
EGFRCR SERPLBLD CKD-EPI 2021: 56 ML/MIN/1.73M2 (ref 60–?)
EOSINOPHIL # BLD AUTO: 0.04 X10(3) UL (ref 0–0.7)
EOSINOPHIL NFR BLD AUTO: 0.7 %
ERYTHROCYTE [DISTWIDTH] IN BLOOD BY AUTOMATED COUNT: 13.6 %
EST. AVERAGE GLUCOSE BLD GHB EST-MCNC: 105 MG/DL (ref 68–126)
FASTING PATIENT LIPID ANSWER: YES
FASTING STATUS PATIENT QL REPORTED: YES
GLOBULIN PLAS-MCNC: 2.9 G/DL (ref 2–3.5)
GLUCOSE BLD-MCNC: 105 MG/DL (ref 70–99)
HBA1C MFR BLD: 5.3 % (ref ?–5.7)
HCT VFR BLD AUTO: 39.8 % (ref 35–48)
HDLC SERPL-MCNC: 99 MG/DL (ref 40–59)
HGB BLD-MCNC: 13.3 G/DL (ref 12–16)
IMM GRANULOCYTES # BLD AUTO: 0.01 X10(3) UL (ref 0–1)
IMM GRANULOCYTES NFR BLD: 0.2 %
LDLC SERPL CALC-MCNC: 109 MG/DL (ref ?–100)
LYMPHOCYTES # BLD AUTO: 1.3 X10(3) UL (ref 1–4)
LYMPHOCYTES NFR BLD AUTO: 22.6 %
MCH RBC QN AUTO: 29 PG (ref 26–34)
MCHC RBC AUTO-ENTMCNC: 33.4 G/DL (ref 31–37)
MCV RBC AUTO: 86.9 FL (ref 80–100)
MONOCYTES # BLD AUTO: 0.42 X10(3) UL (ref 0.1–1)
MONOCYTES NFR BLD AUTO: 7.3 %
NEUTROPHILS # BLD AUTO: 3.95 X10 (3) UL (ref 1.5–7.7)
NEUTROPHILS # BLD AUTO: 3.95 X10(3) UL (ref 1.5–7.7)
NEUTROPHILS NFR BLD AUTO: 68.7 %
NONHDLC SERPL-MCNC: 120 MG/DL (ref ?–130)
OSMOLALITY SERPL CALC.SUM OF ELEC: 286 MOSM/KG (ref 275–295)
PLATELET # BLD AUTO: 288 10(3)UL (ref 150–450)
POTASSIUM SERPL-SCNC: 3.4 MMOL/L (ref 3.5–5.1)
PROT SERPL-MCNC: 7.7 G/DL (ref 5.7–8.2)
RBC # BLD AUTO: 4.58 X10(6)UL (ref 3.8–5.3)
SODIUM SERPL-SCNC: 138 MMOL/L (ref 136–145)
TRIGL SERPL-MCNC: 62 MG/DL (ref 30–149)
TSI SER-ACNC: 0.56 UIU/ML (ref 0.55–4.78)
VIT D+METAB SERPL-MCNC: 42.9 NG/ML (ref 30–100)
VLDLC SERPL CALC-MCNC: 10 MG/DL (ref 0–30)
WBC # BLD AUTO: 5.8 X10(3) UL (ref 4–11)

## 2025-06-30 PROCEDURE — 84443 ASSAY THYROID STIM HORMONE: CPT

## 2025-06-30 PROCEDURE — 82306 VITAMIN D 25 HYDROXY: CPT

## 2025-06-30 PROCEDURE — 85025 COMPLETE CBC W/AUTO DIFF WBC: CPT

## 2025-06-30 PROCEDURE — 99396 PREV VISIT EST AGE 40-64: CPT | Performed by: INTERNAL MEDICINE

## 2025-06-30 PROCEDURE — 36415 COLL VENOUS BLD VENIPUNCTURE: CPT

## 2025-06-30 PROCEDURE — 84410 TESTOSTERONE BIOAVAILABLE: CPT

## 2025-06-30 PROCEDURE — 80053 COMPREHEN METABOLIC PANEL: CPT

## 2025-06-30 PROCEDURE — 83036 HEMOGLOBIN GLYCOSYLATED A1C: CPT

## 2025-06-30 PROCEDURE — 90715 TDAP VACCINE 7 YRS/> IM: CPT | Performed by: INTERNAL MEDICINE

## 2025-06-30 PROCEDURE — 90471 IMMUNIZATION ADMIN: CPT | Performed by: INTERNAL MEDICINE

## 2025-06-30 PROCEDURE — 80061 LIPID PANEL: CPT
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
## 2025-06-30 NOTE — PATIENT INSTRUCTIONS
- Get blood tests done today  - Schedule DEXA scan.  If it is normal I would not recommend repeating the scan for a while.  - Will enter referrals for Jonathan Graves Peng (Dr. Omalley's partner), Sidney Nielsen (allergist)  - Schedule appointment with our Pulmonology clinic for your cough:  Dipti Wu Kessler, Mehdi  [address]  [address]  [place] [address]  [phone]    - Tetanus shot given today  - Schedule office visit for September so we can re-check your blood pressure. Bring your blood pressure machine with you to this appointment.    It was a pleasure seeing you in the clinic today.  Thank you for choosing the [facility] [place] office for your healthcare needs. Please call at [phone] with any questions or concerns.    Aisha Jewell MD

## 2025-06-30 NOTE — TELEPHONE ENCOUNTER
Pt forgot to ask RP while she was here today if she can get a handicap placard. Asking if RP would be able to fill one out for her and we can let her know when it is ready for pickup?

## 2025-07-01 ENCOUNTER — HOSPITAL ENCOUNTER (OUTPATIENT)
Dept: BONE DENSITY | Age: 55
Discharge: HOME | End: 2025-07-01
Attending: INTERNAL MEDICINE
Payer: COMMERCIAL

## 2025-07-01 DIAGNOSIS — Z00.00 ENCOUNTER FOR PREVENTATIVE ADULT HEALTH CARE EXAMINATION: ICD-10-CM

## 2025-07-01 DIAGNOSIS — E34.9 HORMONAL DISORDER: ICD-10-CM

## 2025-07-01 PROCEDURE — 77080 DXA BONE DENSITY AXIAL: CPT | Performed by: INTERNAL MEDICINE

## 2025-07-07 LAB
SEX HORM BIND GLOB: 104 NMOL/L
TESTOST % FREE+WEAK BND: 6.6 %
TESTOST FREE+WEAK BND: 0.9 NG/DL
TESTOSTERONE TOT /MS: 14 NG/DL

## 2025-07-11 ENCOUNTER — APPOINTMENT (OUTPATIENT)
Dept: GENERAL RADIOLOGY | Age: 55
End: 2025-07-11
Attending: EMERGENCY MEDICINE
Payer: COMMERCIAL

## 2025-07-11 ENCOUNTER — HOSPITAL ENCOUNTER (OUTPATIENT)
Age: 55
Discharge: HOME | End: 2025-07-11
Attending: EMERGENCY MEDICINE
Payer: COMMERCIAL

## 2025-07-11 ENCOUNTER — LAB ENCOUNTER (OUTPATIENT)
Dept: LAB | Age: 55
End: 2025-07-11
Attending: INTERNAL MEDICINE
Payer: COMMERCIAL

## 2025-07-11 VITALS
SYSTOLIC BLOOD PRESSURE: 153 MMHG | OXYGEN SATURATION: 100 % | HEART RATE: 118 BPM | RESPIRATION RATE: 20 BRPM | BODY MASS INDEX: 22 KG/M2 | DIASTOLIC BLOOD PRESSURE: 83 MMHG | WEIGHT: 138 LBS | TEMPERATURE: 98 F

## 2025-07-11 DIAGNOSIS — R23.2 HOT FLASHES: ICD-10-CM

## 2025-07-11 DIAGNOSIS — S46.911A SHOULDER STRAIN, RIGHT, INITIAL ENCOUNTER: ICD-10-CM
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
## 2025-07-11 DIAGNOSIS — S46.811A TRAPEZIUS MUSCLE STRAIN, RIGHT, INITIAL ENCOUNTER: ICD-10-CM

## 2025-07-11 LAB
ESTRADIOL SERPL-MCNC: 92.6 PG/ML
FSH SERPL-ACNC: 6.8 MIU/ML
LH SERPL-ACNC: 2.2 MIU/ML
PROGEST SERPL-MCNC: 24.53 NG/ML

## 2025-07-11 PROCEDURE — 72050 X-RAY EXAM NECK SPINE 4/5VWS: CPT | Performed by: EMERGENCY MEDICINE

## 2025-07-11 PROCEDURE — 99213 OFFICE O/P EST LOW 20 MIN: CPT

## 2025-07-11 PROCEDURE — 83002 ASSAY OF GONADOTROPIN (LH): CPT

## 2025-07-11 PROCEDURE — 99214 OFFICE O/P EST MOD 30 MIN: CPT

## 2025-07-11 PROCEDURE — 84144 ASSAY OF PROGESTERONE: CPT

## 2025-07-11 PROCEDURE — 36415 COLL VENOUS BLD VENIPUNCTURE: CPT

## 2025-07-11 PROCEDURE — 82670 ASSAY OF TOTAL ESTRADIOL: CPT

## 2025-07-11 PROCEDURE — 73030 X-RAY EXAM OF SHOULDER: CPT | Performed by: EMERGENCY MEDICINE

## 2025-07-11 PROCEDURE — 83001 ASSAY OF GONADOTROPIN (FSH): CPT
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
## 2025-07-11 NOTE — ED INITIAL ASSESSMENT (HPI)
C/o fell going down the stairs yesterday. Hit the steps the wrong way. Pain neck, back and right shoulder. Hit head on the stairs. Both hands slapped the stairs, some stinging pain. No loss of consciousness.

## 2025-07-11 NOTE — DISCHARGE INSTRUCTIONS
You may take ibuprofen 600 mg every 6 hours for pain.    Activity as tolerated.    You may take a warm shower but do not sit in a hot tub or use a heating pad.  Use ice where it hurts, 20 minutes on and 20 minutes off.

## 2025-08-19 ENCOUNTER — OFFICE VISIT (OUTPATIENT)
Dept: NEUROLOGY | Facility: CLINIC | Age: 55
End: 2025-08-19

## 2025-08-19 VITALS
HEART RATE: 115 BPM | HEIGHT: 66 IN | WEIGHT: 138 LBS | SYSTOLIC BLOOD PRESSURE: 155 MMHG | OXYGEN SATURATION: 100 % | BODY MASS INDEX: 22.18 KG/M2 | RESPIRATION RATE: 16 BRPM | DIASTOLIC BLOOD PRESSURE: 109 MMHG

## 2025-08-19 DIAGNOSIS — G43.009 MIGRAINE WITHOUT AURA AND WITHOUT STATUS MIGRAINOSUS, NOT INTRACTABLE: ICD-10-CM
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
## 2025-08-19 DIAGNOSIS — D18.09 ORBITAL HEMANGIOMA: Primary | ICD-10-CM

## 2025-08-19 PROCEDURE — 99214 OFFICE O/P EST MOD 30 MIN: CPT | Performed by: OTHER

## 2025-08-19 RX ORDER — TOPIRAMATE 100 MG/1
100 TABLET, FILM COATED ORAL 2 TIMES DAILY
Qty: 60 TABLET | Refills: 5 | Status: SHIPPED | OUTPATIENT
Start: 2025-08-19

## 2025-08-19 RX ORDER — SUMATRIPTAN SUCCINATE 100 MG/1
100 TABLET ORAL EVERY 2 HOUR PRN
Qty: 10 TABLET | Refills: 5 | Status: SHIPPED | OUTPATIENT
Start: 2025-08-19

## (undated) DEVICE — BANDAID CURAD 3IN X 1IN

## (undated) DEVICE — NEEDLE SPINAL 22X3-1/2 BLK

## (undated) DEVICE — GLOVE SURG SENSICARE SZ 6-1/2

## (undated) DEVICE — NEEDLE SPINAL 25X3-1/2 BLUE

## (undated) DEVICE — PAIN TRAY: Brand: MEDLINE INDUSTRIES, INC.

## (undated) DEVICE — REMOVER PREP SOLUTION 4OZ

## (undated) DEVICE — GLOVE SURG SENSICARE SZ 7-1/2

## (undated) DEVICE — SKIN MARKER DUAL TIP WITH RULER CAP AND LABELS: Brand: DEVON

## (undated) NOTE — MR AVS SNAPSHOT
[place]  [address]  [address] [facility] [address]  [phone]               Thank you for choosing us for your health care visit with Clemmie Runner, MD.  We are glad to serve you and happy to provide you with this summa AmLODIPine Besylate 10 MG Tabs   Take 1 tablet (10 mg total) by mouth daily. What changed:    - medication strength  - how much to take   Commonly known as:  NORVASC           atenolol 25 MG Tabs   Take 1 tablet (25 mg total) by mouth daily.    Commonly Summaries. If you've been to the Emergency Department or your doctor's office, you can view your past visit information in [facility] by going to Visits < Visit Summaries. [facility] questions? Call [phone] for help.   [facility] is NOT to be used for urge

## (undated) NOTE — LETTER
05/01/19        [place] [address]      Dear Pamela Salazar,    1579 [facility] records indicate that you have outstanding lab work and or testing that was ordered for you and has not yet been completed: Ultrasound and fasti

## (undated) NOTE — LETTER
Patient Name: Chuyita Leiva  YOB: 1970          MRN number:  [id number]  Date:  11/22/2023  Referring Physician:  Otilia Valero    Discharge Summary  Pt has attended 4 visits in Speech Therapy. Dear Dr. Shobha Rubio,  This letter is to inform you of Alem Adams's progress in speech-language therapy. Since her initial evaluation, Alem Robledo has attended 4 sessions. Therapy sessions have targeted training of vocal cord dysfunction rescue breathing and diaphragmatic breathing/breath support strategies. A home exercise program (HEP) addressing use of strategies and rescue breathing has been provided and completed consistently. During this treatment period, Alem Robledo has demonstrated improved ability to manage and prevent vocal cord dysfunction attacks. As Alem Robledo has demonstrated significant progress and has met all goals and demonstrates independence with strategies, it is recommended that she be discharged from speech therapy at this time. Thank you for your referral. Please re-consult should further concerns arise. Subjective: Patient arrived late to session. Patient participated actively in therapeutic tasks. Pain: No pain reported on this date. Patient not being seen for pain. Objective:  Goals:    1)  Pt will independently demonstrate easy breathing strategies and methods for preventing/remediating a VCD/chronic cough attack in 8/10 trials at rest.  Progress: 10/10 trials at rest. Goal met. 2)  Pt will independently demonstrate easy breathing strategies during mild to moderate exertion in 8/10 trials. Progress: 10/10 trials. Goal met. 3)  Pt will report improved breathing and decreased coughing across daily tasks for the duration of 1 month in order to improve his/her ability to breathe efficiently and fully participate in all daily activities. Progress: Goal met per patient report.      HEP: Use of rescue breathing and diaphragmatic breathing at rest  Education: Vocal anatomy and physiology and impact of respiration on phonation    Assessment: Patient presented to speech therapy with c/o vocal cord dysfunction characterized by difficulty inhaling, laryngeal tightness/tension during episodes of SOB, improved breathing when laryngeal tension decreases, quickly resolving breathing symptoms following episodes, poor response to asthma medication, increased difficulty breathing in stressful situations. Throughout plan of care, patient has demonstrated significant improvement and progress toward goals. Patient reports significantly decreased vocal cord dysfunction episodes/attacks. Patient is independent in her ability to control/manage and prevent VCD attacks and utilize diaphragmatic breathing/relaxed throat breathing. Plan: Patient to be discharged from speech-language therapy as all goals have been met. Patient/Family/Caregiver was advised of these findings, precautions, and treatment options and has agreed to actively participate in planning and for this course of care. Thank you for your referral. If you have any questions, please contact me at Dept: [phone]. Sincerely,  Electronically signed by therapist: JASON Mckeon         21st Century Cures Act Notice to Patient: Medical documents like this are made available to patients in the interest of transparency. However, be advised this is a medical document and it is intended as [id number] communication between your medical providers. This medical document may contain abbreviations, assessments, medical data, and results or other terms that are unfamiliar. Medical documents are intended to carry relevant information, facts as evident, and the clinical opinion of the practitioner. As such, this medical document may be written in language that appears blunt or direct.  You are encouraged to contact your medical provider and/or [facility] Patient Experience if you have any questions about this medical document.

## (undated) NOTE — Clinical Note
This patient needs 1 month follow up for her mood disorder. I can't recall if we scheduled it or not. OK to do a 15 min slot, but ideally she needs a full 30 min to properly address her mental health needs. Dr. Shelby Guajardo

## (undated) NOTE — ED AVS SNAPSHOT
Cary Cote   MRN: [id number]    Department:  [facility] Department in [place]   Date of Visit:  6/6/2018           Disclosure     Insurance plans vary and the physician(s) referred by the ER may not be covered by your plan.  Please c tell this physician (or your personal doctor if your instructions are to return to your personal doctor) about any new or lasting problems. The primary care or specialist physician will see patients referred from the [facility] Emergency Department.  Salvadore Skiff

## (undated) NOTE — MR AVS SNAPSHOT
[address] [phone]               Thank you for choosing us for your health care visit with Linus Das PA-C.   We are glad to serve you and happy to provide you with this reyes ? Refills are not addressed on weekends; covering physicians do not authorize routine medications on weekends. ? No narcotics or controlled substances are refilled after noon on Fridays or by on call physicians.   ? By law, narcotics cannot be faxed or guera approved and is a COVERED benefit. Although the [facility] staff does its due diligence, the insurance carrier gives the disclaimer that \"Although the procedure is authorized, this does not guarantee payment. \"    Ultimately the patient is responsible for payme ? Enbrel (Etanercept) 24 hours   ? Fragmin (Dalteparin) 24 hours   ? Pletal (Cilostazol) 7 days  ? Pradaxa 10 days  ? Trental 7 days  ? Eliquis (Apixaban) 3 days  ? Xarelto (Rivaroxaban) 3 days  ? Lovenox (Enoxaparin) 24 hours  ? Aspirin  ?  81mg 24 hours It is normal to have increased pain at injection site for up to 3-5 days after procedure, you can use heat for the first 24 hours (20 minutes on 20 minutes off) after the first 24 hours you can use heat or cold.        Facet Joint Injections  What is a face No, this procedure is done with a local anesthetic. Some patients choose to receive intravenous sedation that can make the procedure easier to tolerate.  This type of sedation may cause amnesia and patients may not remember parts or all of the actual proced period. This is because the effect of the medication injected frequently lasts for six months or more. If three injections have not helped you very much, you may be a candidate for a different procedure.  It is important to keep follow up appointment to salvador diagnostic or trial injections to be a candidate for radiofrequency ablation. What are the benefits of radiofrequency ablation? Radiofrequency ablation disrupts nerve conduction, specifically interrupting the conduction of pain signals.  In turn, this m when electric current is passed through it. This effectively numbs or stuns the nerves semi-permanently. Once done, the needles are removed and an adhesive bandage  is applied. Will the radiofrequency ablation hurt?   Layers of muscle and soft tissues pro How many radiofrequency ablations do I need to have? If the first procedure does not completely relieve your symptoms, you may be recommended to have a repeat or touch-up procedure after the first two to three weeks.  Because these are not permanent proced Commonly known as:  NORVASC           atenolol 25 MG Tabs   Take 1 tablet (25 mg total) by mouth daily.    Commonly known as:  TENORMIN           diazepam 5 MG Tabs   TAKE 1 TABLET BY MOUTH TWICE A DAY   Commonly known as:  VALIUM           Fluticasone Prop

## (undated) NOTE — LETTER
[facility]  Carmine Goldman 61 [address] [facility], [address] [place] [address]    Consent for Operation    Date: __________________    Time: _______________    1.  I authorize the performance upon Kenneth Callahan the following operation:    Procedure(s):  SUBDELTOID BUR procedure has been videotaped, the surgeon will obtain the original videotape. The hospital will not be responsible for storage or maintenance of this tape.     6. For the purpose of advancing medical education, I consent to the admittance of observers to t STATEMENTS REQUIRING INSERTION OR COMPLETION WERE FILLED IN.     Signature of Patient:   ___________________________    When the patient is a minor or mentally incompetent to give consent:  Signature of person authorized to consent for patient: ____________ supplements, and pills I can buy without a prescription (including street drugs/illegal medications). Failure to inform my anesthesiologist about these medicines may increase my risk of anesthetic complications.   · If I am allergic to anything or have had Anesthesiologist Signature     Date   Time  I have discussed the procedure and information above with the patient (or patient’s representative) and answered their questions. The patient or their representative has agreed to have anesthesia services.     ___

## (undated) NOTE — LETTER
03/18/20        [facility]  [address]  [place] [address]      Dear Bhumi Castrejon,    7[address] [facility] records indicate that you have outstanding lab work and or testing that was ordered for you and has not yet been completed:      US THYROID (CPT

## (undated) NOTE — ED AVS SNAPSHOT
[facility] in [address] [place] [address]  Phone:  [phone]  Fax:  [phone]          [place] Cherie Ravinder   MRN: [id number]    Department:  [facility] in [place]   Date of Visit:  7/ IF THERE IS ANY CHANGE OR WORSENING OF YOUR CONDITION, CALL YOUR PRIMARY CARE PHYSICIAN AT ONCE OR RETURN IMMEDIATELY TO THE EMERGENCY DEPARTMENT.     If you have been prescribed any medication(s), please fill your prescription right away and begin taking t

## (undated) NOTE — Clinical Note
Kathia Khan, this will be a new patient. My note is now complete regarding more specifics of her mood disorder, stress, marital issues. I did start her on medication at this time.  I don't think she needs to see Psych just quite yet, but I would entertain this po

## (undated) NOTE — IP AVS SNAPSHOT
[facility] [place] [address] [place], [address] ~ [phone]                Discharge Summary   5/2/2017    Kenneth Callahan           Admission Information        Provider Department    5/2/2017 Na Carey MD [facility] selenium sulfide 2.5 % Lotn   Commonly known as:  SELSUN        Apply  topically daily as needed (tinea versicolor).       [    ]    [    ]    [    ]    [    ]       TraMADol HCl 50 MG Tabs   Commonly known as:  ULTRAM        TAKE 1 TABLET BY MOUTH EVER 11.  Since you may have some weakness in your legs or arms for a few hours after the procedure, we ask you not to drive for the next 24 hours.  If you have any questions after the procedure, please call your physician at [phone]  If your doctor is unav We want to hear from you, please share your experience with us by returning the survey you will receive in the mail. Thank you!         MyChart     Visit [facility]  You can access your MyChart to more actively manage your health care and view more details f

## (undated) NOTE — Clinical Note
ED [facility], [facility], [address] [place] [address]  [address] [place] [address]            January 17, 2017    Patient: Jarret Aguirre  YOB: 1970  Member [id number]  Reference #[id number] completed in October and November of 2016 provided her with near perfect pain relief, patient indicates that pain relief was much greater than 80% and felt that an additional series of injections would provide enough relief for patient to discontinue use o

## (undated) NOTE — LETTER
12/02/21        M[facility] Letters  [address] [place] [address]   [address] [address]      Dear Gloria Bullock,    1579 [facility] records indicate that you have outstanding lab work and or testing that was ordered for you and has not yet been completed:  Orders Placed This

## (undated) NOTE — LETTER
Date: 1/31/2018    Patient Name: Zunilda Castellon          To Whom it may concern: This letter has been written at the patient's request. The above patient was seen at the [facility] for treatment of a medical condition on 1/17/18.

## (undated) NOTE — LETTER
Date: 1/17/2018    Patient Name: Gerardo Castellon          To Whom it may concern: This letter has been written at the patient's request. The above patient was seen at the [facility] for treatment of a medical condition.     The bennie

## (undated) NOTE — LETTER
Date: 1/30/2018    Patient Name: Kadeem Castellon          To Whom it may concern: This letter has been written at the patient's request. The above patient was seen at the [facility] for treatment of a medical condition on 1/17/18.

## (undated) NOTE — MR AVS SNAPSHOT
[facility], [place] [address] [facility], [address] [place] [phone]               Thank you for choosing us for your health care visit with JAMAICA Fay.   We are glad to serve you and happy to provide you with this sum ? Patient must present photo ID at time of . If a designated family member will be picking up prescription, office must be given name of individual in advance and they must present an ID as well. ?  The name of the person picking up your prescripti symptoms of infection such as fever, chills, cough, and urinary symptoms. Day of Procedure:   Do not eat or drink anything (including water) 6 hours prior to your procedure. ?  Please take your morning blood pressure medication with a small sip of chel Most insurances are now requiring a preauthorization for all procedures. In the event that your insurance does not authorize your procedure within 48 hours of the scheduled date, your procedure will be cancelled and rescheduled to a later date.    Please Adhesive Tape Rash    Band-Aid Anti-Itch Rash    Band aid glue     Midrin [Apap-Isometheptene-Dichloral] Hives                Today's Vital Signs     BP Pulse Height Weight BMI    118/70 mmHg 64 66\" 140 lb 22.61 kg/m2         Current Medications office, you can view your past visit information in [facility] by going to Visits < Visit Summaries. Gennio questions? Call [phone] for help. [facility] is NOT to be used for urgent needs. For medical emergencies, dial 911.            Visit [facility]

## (undated) NOTE — MR AVS SNAPSHOT
[place]  [address] [facility]  [facility] [address] [phone]               Thank you for choosing us for your health care visit with Tito Gonzalez MD.  We are glad to serve you and happy to provide you with this s Lab Results           Medical Issues Discussed Today     Unexplained weight loss    -  Primary    Fatigue, unspecified type          Instructions and Information about Your Health    - check with insurance about coverage for x-ray and blood work  - check Apply  topically daily as needed (tinea versicolor). Commonly known as:  SELSUN           TraMADol HCl 50 MG Tabs   Take 1 tablet (50 mg total) by mouth every 12 (twelve) hours as needed for Pain.    Commonly known as:  ULTRAM   Start taking on:  6/17/201

## (undated) NOTE — Clinical Note
Please fax my note to Dr. Feli Torres from [facility]. Ely Priest. Rima Herrera, American Board of Internal MedicineSierra [facility]son, [facility] [place] [facility],[address]Th Floor, [address], [facility], [address]: [phone]

## (undated) NOTE — LETTER
05/04/22        [place]  [address]  [place] [address]      Dear Liya Olivia,    15[facility] records indicate that you have outstanding lab work and or testing that was ordered for you and has not yet been completed:  Orders Placed This Encounter      NM MBI BILATERAL(CPT=[id number])    To provide you with the best possible care, please complete these orders at your earliest convenience. If you have recently completed these orders please disregard this letter. If you have any questions please call the office at Dept: [phone].      Thank you,       MASTER Manjarrez